# Patient Record
Sex: FEMALE | Race: BLACK OR AFRICAN AMERICAN | Employment: FULL TIME | URBAN - METROPOLITAN AREA
[De-identification: names, ages, dates, MRNs, and addresses within clinical notes are randomized per-mention and may not be internally consistent; named-entity substitution may affect disease eponyms.]

---

## 2017-04-14 DIAGNOSIS — E03.9 ACQUIRED HYPOTHYROIDISM: ICD-10-CM

## 2017-04-14 DIAGNOSIS — I10 ESSENTIAL HYPERTENSION: ICD-10-CM

## 2017-04-14 NOTE — TELEPHONE ENCOUNTER
Pt needs Synthroid and her fluid pill sent to [facility] on file. Pt has not been seen since August 2014.

## 2017-04-17 RX ORDER — HYDROCHLOROTHIAZIDE 25 MG/1
25 TABLET ORAL DAILY
Qty: 90 TAB | Refills: 0 | Status: SHIPPED | OUTPATIENT
Start: 2017-04-17 | End: 2017-07-07 | Stop reason: SDUPTHER

## 2017-04-17 RX ORDER — LEVOTHYROXINE SODIUM 175 UG/1
175 TABLET ORAL
Qty: 90 TAB | Refills: 0 | Status: SHIPPED | OUTPATIENT
Start: 2017-04-17 | End: 2017-07-07 | Stop reason: SDUPTHER

## 2017-04-17 NOTE — TELEPHONE ENCOUNTER
Called pt and informed her that she has not been seen since August 2014 and would have to be seen for further refills. Pt stated \"that's a lie\" and that Dr. Abelina Simmonds told her she could just call when she needed refills and that she had 3 brain sugeries last year and can't go without med.

## 2017-04-17 NOTE — TELEPHONE ENCOUNTER
Attempted to call. Unsuccessful. Left msg for Roberto Daily to give us a call back at the office. A callback number was left.

## 2017-04-17 NOTE — TELEPHONE ENCOUNTER
We will send 2 months supply of thyroid medication and with out seeing and checking labs I can not  send refills , as they are medication interactions and adverse reactions which we have watch for     If she was seen in 2014 - more than 2 years ago     She can get it from PCP or has to be seen for future refills

## 2017-04-17 NOTE — TELEPHONE ENCOUNTER
PSR Mor informed pt of Dr. Andres Monique note. Next available appt not unitl July. Pt scheduled for 7/6/17.

## 2017-07-07 ENCOUNTER — OFFICE VISIT (OUTPATIENT)
Dept: ENDOCRINOLOGY | Age: 52
End: 2017-07-07

## 2017-07-07 VITALS
HEART RATE: 82 BPM | OXYGEN SATURATION: 100 % | BODY MASS INDEX: 45.18 KG/M2 | TEMPERATURE: 98.8 F | DIASTOLIC BLOOD PRESSURE: 75 MMHG | HEIGHT: 65 IN | WEIGHT: 271.2 LBS | RESPIRATION RATE: 18 BRPM | SYSTOLIC BLOOD PRESSURE: 126 MMHG

## 2017-07-07 DIAGNOSIS — E03.9 ACQUIRED HYPOTHYROIDISM: ICD-10-CM

## 2017-07-07 DIAGNOSIS — E03.9 ACQUIRED HYPOTHYROIDISM: Primary | ICD-10-CM

## 2017-07-07 DIAGNOSIS — I10 ESSENTIAL HYPERTENSION: ICD-10-CM

## 2017-07-07 DIAGNOSIS — E23.6 EMPTY SELLA (HCC): ICD-10-CM

## 2017-07-07 RX ORDER — HYDROCHLOROTHIAZIDE 25 MG/1
25 TABLET ORAL DAILY
Qty: 90 TAB | Refills: 3 | Status: SHIPPED | OUTPATIENT
Start: 2017-07-07 | End: 2018-12-07 | Stop reason: SDUPTHER

## 2017-07-07 RX ORDER — LEVOTHYROXINE SODIUM 175 UG/1
175 TABLET ORAL
Qty: 90 TAB | Refills: 3 | Status: SHIPPED | OUTPATIENT
Start: 2017-07-07 | End: 2017-07-18 | Stop reason: SDUPTHER

## 2017-07-07 RX ORDER — ASPIRIN 81 MG/1
TABLET ORAL DAILY
COMMUNITY

## 2017-07-07 RX ORDER — AMOXICILLIN 500 MG/1
500 TABLET, FILM COATED ORAL 3 TIMES DAILY
Qty: 15 TAB | Refills: 0 | Status: SHIPPED | OUTPATIENT
Start: 2017-07-07 | End: 2018-12-07 | Stop reason: ALTCHOICE

## 2017-07-07 NOTE — MR AVS SNAPSHOT
Visit Information Date & Time Provider Department Dept. Phone Encounter #  
 7/7/2017  9:30 AM Kiera Herrera MD [facility] [phone] [id number] Follow-up Instructions Return in about 1 year (around 7/7/2018). Upcoming Health Maintenance Date Due DTaP/Tdap/Td series (1 - Tdap) 10/31/1988 PAP AKA CERVICAL CYTOLOGY 10/31/1988 Pneumococcal 19-64 High Risk (2 of 2 - PCV13) 2/19/2017 INFLUENZA AGE 9 TO ADULT 8/1/2017 Allergies as of 7/7/2017  Review Complete On: 7/7/2017 By: José Miguel Tse RN Severity Noted Reaction Type Reactions Latex  07/07/2017    Itching [place] High 02/15/2016   Systemic Hives Other Medication  07/07/2017    Rash Tape adhesive Current Immunizations  Never Reviewed Name Date Pneumococcal Polysaccharide (PPSV-23) 2/19/2016  1:06 PM  
  
 Not reviewed this visit You Were Diagnosed With   
  
 Codes Comments Acquired hypothyroidism    -  Primary ICD-10-CM: E03.9 ICD-9-CM: 244.9 Empty sella ([address]     ICD-10-CM: E23.6 ICD-9-CM: 253.8 Essential hypertension     ICD-10-CM: I10 
ICD-9-CM: 401.9 Vitals BP Pulse Temp Resp Height(growth percentile) Weight(growth percentile) 126/75 (BP 1 Location: Right arm, BP Patient Position: Sitting) 82 98.8 °F (37.1 °C) (Oral) 18 5' 5\" (1.651 m) 271 lb 3.2 oz (123 kg) SpO2 BMI OB Status Smoking Status 100% 45.13 kg/m2 Hysterectomy Current Every Day Smoker BMI and BSA Data Body Mass Index Body Surface Area  
 45.13 kg/m 2 2.38 m 2 Preferred Pharmacy Pharmacy Name Phone [facility] [address] Your Updated Medication List  
  
   
This list is accurate as of: 7/7/17 10:32 AM.  Always use your most recent med list.  
  
  
  
  
 acetaZOLAMIDE  mg capsule Commonly known as:  DIAMOX Take 1 Cap by mouth every twelve (12) hours. aspirin delayed-release 81 mg tablet Take  by mouth daily. hydroCHLOROthiazide 25 mg tablet Commonly known as:  HYDRODIURIL Take 1 Tab by mouth daily. HYDROCODONE BITARTRATE PO Take  by mouth.  
  
 levothyroxine 175 mcg tablet Commonly known as:  SYNTHROID Take 1 Tab by mouth Daily (before breakfast). Follow-up Instructions Return in about 1 year (around 7/7/2018). Introducing [facility]! Dear Stevo Fuller: Thank you for requesting a [employer] account. Our records indicate that you already have an active [employer] account. You can access your account anytime at [email or web address] Did you know that you can access your hospital and ER discharge instructions at any time in [employer]? You can also review all of your test results from your hospital stay or ER visit. Additional Information If you have questions, please visit the Frequently Asked Questions section of the [employer] website at [email or web address] Remember, [employer] is NOT to be used for urgent needs. For medical emergencies, dial 911. Now available from your iPhone and Android! Please provide this summary of care documentation to your next provider. Your primary care clinician is listed as NONE. If you have any questions after today's visit, please call [phone].

## 2017-07-07 NOTE — PROGRESS NOTES
Davina Dunn MD              Patient Information  Date:7/7/2017  Name : Chet Juárez 54 y.o.     YOB: 1965         Referred by: Dr Helga Colin      Chief Complaint   Patient presents with    Thyroid Problem       History of Present Illness: Chet Juárez is a 54 y.o. female here follow-up    History of primary hypothyroidism on replacement levothyroxine. Reports compliance   Cough with yellowish sputum , 3 weeks ,feels warm  Seen in 2014 and came of med refills  Has moved to [facility] and looking for PCP now   No change in the size of the neck. Found to have partial empty sella on MRI,pituitary hormonal screen normal    12/13  MRI   The enlarged sella turcica is unchanged and predominantly filled with CSF. The pituitary gland is located in the inferior periphery of the sella. No abnormal enhancement. The infundibulum is midline and demonstrates normal signal and caliber. No mass effect on the optic chiasm. The cavernous   sinuses are within normal limits. The cavernous carotid flow-voids are   patent. Optic nerves may be slightly thickened. Subtle flattening of the optic nerve   insertions on the globe. History of primary hypothyroidism on replacement levothyroxine.     Family hx thyroid cancer    Wt Readings from Last 3 Encounters:   07/07/17 271 lb 3.2 oz (123 kg)   04/11/16 277 lb 12.8 oz (126 kg)   02/19/16 282 lb 3 oz (128 kg)     Past Medical History:   Diagnosis Date    Arthritis     Coagulation disorder ([address]     anemia    Endocrine disease     Fluid retention     Headache     Hypertension     Hypothyroid     Neurological disorder     migraine    Sleep apnea     STATES TESTED, NO MACHINE RECOMMENDED     Past Surgical History:   Procedure Laterality Date    HX OTHER SURGICAL      brain surgery cerberi pseudotumor    HX PARTIAL HYSTERECTOMY      HX TONSILLECTOMY       Current Outpatient Prescriptions   Medication Sig    aspirin delayed-release 81 mg tablet Take  by mouth daily.  hydroCHLOROthiazide (HYDRODIURIL) 25 mg tablet Take 1 Tab by mouth daily.  levothyroxine (SYNTHROID) 175 mcg tablet Take 1 Tab by mouth Daily (before breakfast).  HYDROCODONE BITARTRATE PO Take  by mouth.  acetaZOLAMIDE SR (DIAMOX) 500 mg capsule Take 1 Cap by mouth every twelve (12) hours. No current facility-administered medications for this visit. Allergies   Allergen Reactions    Latex Itching    [place] Hives    Other Medication Rash     Tape adhesive         Review of Systems:  - Constitutional Symptoms: no fevers, chills,   - Eyes: no blurry vision no double vision  - Cardiovascular: no chest pain no palpitations  - Respiratory: +cough no shortness of breath  - Gastrointestinal: no dysphagia no abdominal pain  - Musculoskeletal: no joint pains no weakness  - Integumentary: no rashes  - Neurological: no numbness, tingling + headaches  -     Physical Examination:  - Blood pressure 126/75, pulse 82, temperature 98.8 °F (37.1 °C), temperature source Oral, resp. rate 18, height 5' 5\" (1.651 m), weight 271 lb 3.2 oz (123 kg), SpO2 100 %. Body mass index is 45.13 kg/(m^2).   - General: pleasant, no distress, good eye contact  - HEENT: no exopthalmos, no periorbital edema, no scleral/conjunctival injection, EOMI, no lid lag or stare,no sinus tenderness  - Neck: supple, no thyromegaly  - Cardiovascular: regular, normal rate, normal S1 and S2  - Respiratory: clear to auscultation bilaterally  - Gastrointestinal: soft, nontender, nondistended  - Musculoskeletal:   no rashes, no edema, no foot ulcers  - Neurological: reflexes 2+ at biceps, no tremor  - Psychiatric: normal mood and affect    Data Reviewed:     Lab Results   Component Value Date/Time    WBC 12.7 02/17/2016 05:12 AM    Hemoglobin (POC) 13.3 02/16/2016 06:40 AM    HGB 12.3 02/17/2016 05:12 AM    Hematocrit (POC) 39 02/16/2016 06:40 AM    HCT 37.4 02/17/2016 05:12 AM    PLATELET 180 02/17/2016 05:12 AM    MCV 91.0 02/17/2016 05:12 AM     Lab Results   Component Value Date/Time    ALT (SGPT) 21 12/31/2013 05:24 PM    AST (SGOT) 10 12/31/2013 05:24 PM    Alk. phosphatase 88 12/31/2013 05:24 PM    Bilirubin, total 0.4 12/31/2013 05:24 PM     Lab Results   Component Value Date/Time    GFR est AA >60 02/18/2016 03:02 AM    GFR est non-AA >60 02/18/2016 03:02 AM    Creatinine (POC) 0.9 02/16/2016 06:40 AM    Creatinine 0.98 02/18/2016 03:02 AM    BUN 12 02/18/2016 03:02 AM    BUN (POC) 17 02/16/2016 06:40 AM    Sodium (POC) 141 02/16/2016 06:40 AM    Sodium 140 02/18/2016 03:02 AM    Potassium 3.9 02/18/2016 03:02 AM    Potassium (POC) 3.4 02/16/2016 06:40 AM    Chloride (POC) 103 02/16/2016 06:40 AM    Chloride 109 02/18/2016 03:02 AM    CO2 21 02/18/2016 03:02 AM      Lab Results   Component Value Date/Time    TSH 1.920 08/22/2014 10:31 AM    T4, Free 1.48 02/04/2014 12:00 AM      Lab Results   Component Value Date/Time    Glucose 124 02/18/2016 03:02 AM    Glucose (POC) 113 02/19/2016 11:46 AM        Assessment/Plan:      Partial empty sella   Pituitary hormonal screen normal 2/2014      Primary hypothyroidism-  Levothyroxine   Recheck TSH    HTN -     History of pseudo-tumor cerebral -FU with neurology      Bronchitis          Thank you for allowing me to participate in the care of this patient.     Shraddha Yee MD

## 2017-07-07 NOTE — LETTER
NOTIFICATION RETURN TO WORK / SCHOOL 
 
7/7/2017 10:39 AM 
 
Ms. Benson Mcgovern [facility] [address] To Whom It May Concern: 
 
Benson Mcgovern is currently under the care of [address] [place] [address]. She was seen on Friday, July 7, 2017 and will return on Monday, July 10, 2017. If there are questions or concerns please have the patient contact our office. Sincerely, Consuelo Barber MD

## 2017-07-07 NOTE — PROGRESS NOTES
Wt Readings from Last 3 Encounters:   07/07/17 271 lb 3.2 oz (123 kg)   04/11/16 277 lb 12.8 oz (126 kg)   02/19/16 282 lb 3 oz (128 kg)     Temp Readings from Last 3 Encounters:   07/07/17 98.8 °F (37.1 °C) (Oral)   04/21/16 98.3 °F (36.8 °C) (Oral)   03/31/16 98.1 °F (36.7 °C) (Oral)     BP Readings from Last 3 Encounters:   07/07/17 126/75   04/27/16 115/74   04/21/16 (!) 155/104     Pulse Readings from Last 3 Encounters:   07/07/17 82   04/27/16 82   04/21/16 (!) 102

## 2017-07-12 LAB
ACTH PLAS-MCNC: 10 PG/ML (ref 7.2–63.3)
ALBUMIN SERPL-MCNC: 3.9 G/DL (ref 3.5–5.5)
ALBUMIN/GLOB SERPL: 1.3 {RATIO} (ref 1.2–2.2)
ALP SERPL-CCNC: 76 IU/L (ref 39–117)
ALT SERPL-CCNC: 18 IU/L (ref 0–32)
AST SERPL-CCNC: 16 IU/L (ref 0–40)
BILIRUB SERPL-MCNC: 0.3 MG/DL (ref 0–1.2)
BUN SERPL-MCNC: 18 MG/DL (ref 6–24)
BUN/CREAT SERPL: 18 (ref 9–23)
CALCIUM SERPL-MCNC: 9.1 MG/DL (ref 8.7–10.2)
CHLORIDE SERPL-SCNC: 105 MMOL/L (ref 96–106)
CO2 SERPL-SCNC: 24 MMOL/L (ref 18–29)
CORTIS SERPL-MCNC: 10.8 UG/DL
CREAT SERPL-MCNC: 1 MG/DL (ref 0.57–1)
GLOBULIN SER CALC-MCNC: 3.1 G/DL (ref 1.5–4.5)
GLUCOSE SERPL-MCNC: 90 MG/DL (ref 65–99)
IGF-I SERPL-MCNC: 140 NG/ML
POTASSIUM SERPL-SCNC: 4.5 MMOL/L (ref 3.5–5.2)
PROLACTIN SERPL-MCNC: 8.4 NG/ML (ref 4.8–23.3)
PROT SERPL-MCNC: 7 G/DL (ref 6–8.5)
SODIUM SERPL-SCNC: 141 MMOL/L (ref 134–144)
TSH SERPL DL<=0.005 MIU/L-ACNC: 0.34 UIU/ML (ref 0.45–4.5)

## 2017-07-18 DIAGNOSIS — E03.9 ACQUIRED HYPOTHYROIDISM: Primary | ICD-10-CM

## 2017-07-18 RX ORDER — LEVOTHYROXINE SODIUM 150 UG/1
150 TABLET ORAL
Qty: 30 TAB | Refills: 5 | Status: SHIPPED | OUTPATIENT
Start: 2017-07-18 | End: 2018-03-05 | Stop reason: SDUPTHER

## 2017-07-18 NOTE — TELEPHONE ENCOUNTER
Informed pt's mother per pt that thyroid medication is being decreased to 150 mcg and it will be sent local pharmacy today, she can check after 5PM for .  Pt's mother verbalized understanding.     ----- Message from Len Ross MD sent at 7/16/2017 12:33 PM EDT -----  Decrease levothyroxine to 150 mcg

## 2018-03-05 DIAGNOSIS — E03.9 ACQUIRED HYPOTHYROIDISM: ICD-10-CM

## 2018-03-06 RX ORDER — LEVOTHYROXINE SODIUM 150 UG/1
150 TABLET ORAL
Qty: 90 TAB | Refills: 3 | Status: SHIPPED | OUTPATIENT
Start: 2018-03-06 | End: 2018-12-10 | Stop reason: SDUPTHER

## 2018-12-07 ENCOUNTER — OFFICE VISIT (OUTPATIENT)
Dept: ENDOCRINOLOGY | Age: 53
End: 2018-12-07

## 2018-12-07 VITALS
TEMPERATURE: 97.6 F | HEART RATE: 101 BPM | DIASTOLIC BLOOD PRESSURE: 81 MMHG | BODY MASS INDEX: 46.55 KG/M2 | OXYGEN SATURATION: 98 % | SYSTOLIC BLOOD PRESSURE: 132 MMHG | HEIGHT: 65 IN | WEIGHT: 279.4 LBS | RESPIRATION RATE: 16 BRPM

## 2018-12-07 DIAGNOSIS — E23.6 EMPTY SELLA (HCC): Primary | ICD-10-CM

## 2018-12-07 DIAGNOSIS — E03.9 ACQUIRED HYPOTHYROIDISM: ICD-10-CM

## 2018-12-07 NOTE — PROGRESS NOTES
Tanisha Wolff is a 48 y.o. female here for   Chief Complaint   Patient presents with    Thyroid Problem       1. Have you been to the ER, urgent care clinic since your last visit? Hospitalized since your last visit? -no    2. Have you seen or consulted any other health care providers outside of the [address] [place] [address] since your last visit?   Include any pap smears or colon screening.-Dr. Mercedes Black

## 2018-12-07 NOTE — PROGRESS NOTES
Thea Collado MD              Patient Information  Date:12/7/2018  Name : Justin Sainz 48 y.o.     YOB: 1965         Referred by: Dr Evie Pena      Chief Complaint   Patient presents with    Thyroid Problem       History of Present Illness: Justin Sainz is a 48 y.o. female here follow-up    History of primary hypothyroidism on replacement levothyroxine. Since last 1 year noticed excessive sweating at night, history of hysterectomy so has not been having cycles for a long time  Weight has been fluctuating  No change in the size of the neck. Found to have partial empty sella on MRI,pituitary hormonal screen normal    12/13  MRI   The enlarged sella turcica is unchanged and predominantly filled with CSF. The pituitary gland is located in the inferior periphery of the sella. No abnormal enhancement. The infundibulum is midline and demonstrates normal signal and caliber. No mass effect on the optic chiasm. The cavernous   sinuses are within normal limits. The cavernous carotid flow-voids are   patent. Optic nerves may be slightly thickened. Subtle flattening of the optic nerve   insertions on the globe. History of primary hypothyroidism on replacement levothyroxine.     Family hx thyroid cancer    Wt Readings from Last 3 Encounters:   12/07/18 279 lb 6.4 oz (126.7 kg)   07/07/17 271 lb 3.2 oz (123 kg)   04/11/16 277 lb 12.8 oz (126 kg)     Past Medical History:   Diagnosis Date    Arthritis     Coagulation disorder ([address]     anemia    Endocrine disease     Fluid retention     Headache(784.0)     Hypertension     Hypothyroid     Neurological disorder     migraine    Sleep apnea     STATES TESTED, NO MACHINE RECOMMENDED     Past Surgical History:   Procedure Laterality Date    HX OTHER SURGICAL      brain surgery cerberi pseudotumor    HX PARTIAL HYSTERECTOMY      HX TONSILLECTOMY       Current Outpatient Medications   Medication Sig    hydroCHLOROthiazide (HYDRODIURIL) 25 mg tablet TAKE ONE TABLET BY MOUTH ONCE DAILY    levothyroxine (SYNTHROID) 150 mcg tablet Take 1 Tab by mouth Daily (before breakfast).  aspirin delayed-release 81 mg tablet Take  by mouth daily.  HYDROCODONE BITARTRATE PO Take  by mouth.  acetaZOLAMIDE SR (DIAMOX) 500 mg capsule Take 1 Cap by mouth every twelve (12) hours. (Patient taking differently: Take 500 mg by mouth as needed.)     No current facility-administered medications for this visit. Allergies   Allergen Reactions    Latex Itching    [place] Hives    Other Medication Rash     Tape adhesive         Review of Systems:  - Constitutional Symptoms: no fevers, chills,   - Eyes: no blurry vision no double vision  - Cardiovascular: no chest pain no palpitations  - Respiratory: +cough no shortness of breath  - Gastrointestinal: no dysphagia no abdominal pain  - Musculoskeletal: no joint pains no weakness  - Integumentary: no rashes  - Neurological: no numbness, tingling + headaches  -     Physical Examination:  - Blood pressure 132/81, pulse (!) 101, temperature 97.6 °F (36.4 °C), temperature source Oral, resp. rate 16, height 5' 5\" (1.651 m), weight 279 lb 6.4 oz (126.7 kg), SpO2 98 %. Body mass index is 46.49 kg/m².   - General: pleasant, no distress, good eye contact  - HEENT: no exopthalmos, no periorbital edema, no scleral/conjunctival injection, EOMI, no lid lag or stare,no sinus tenderness  - Neck: supple, no thyromegaly  - Cardiovascular: regular, normal rate, normal S1 and S2  - Respiratory: clear to auscultation bilaterally  - Gastrointestinal: soft, nontender, nondistended  - Musculoskeletal:   no rashes, no edema, no foot ulcers  - Neurological: reflexes 2+ at biceps, no tremor  - Psychiatric: normal mood and affect    Data Reviewed:     Lab Results   Component Value Date/Time    WBC 12.7 (H) 02/17/2016 05:12 AM    Hemoglobin (POC) 13.3 02/16/2016 06:40 AM    HGB 12.3 02/17/2016 05:12 AM Hematocrit (POC) 39 02/16/2016 06:40 AM    HCT 37.4 02/17/2016 05:12 AM    PLATELET 124 57/05/5394 05:12 AM    MCV 91.0 02/17/2016 05:12 AM     Lab Results   Component Value Date/Time    ALT (SGPT) 18 07/07/2017 10:55 AM    AST (SGOT) 16 07/07/2017 10:55 AM    Alk. phosphatase 76 07/07/2017 10:55 AM    Bilirubin, total 0.3 07/07/2017 10:55 AM     Lab Results   Component Value Date/Time    GFR est AA 76 07/07/2017 10:55 AM    GFR est non-AA 66 07/07/2017 10:55 AM    Creatinine (POC) 0.9 02/16/2016 06:40 AM    Creatinine 1.00 07/07/2017 10:55 AM    BUN 18 07/07/2017 10:55 AM    BUN (POC) 17 02/16/2016 06:40 AM    Sodium (POC) 141 02/16/2016 06:40 AM    Sodium 141 07/07/2017 10:55 AM    Potassium 4.5 07/07/2017 10:55 AM    Potassium (POC) 3.4 (L) 02/16/2016 06:40 AM    Chloride (POC) 103 02/16/2016 06:40 AM    Chloride 105 07/07/2017 10:55 AM    CO2 24 07/07/2017 10:55 AM      Lab Results   Component Value Date/Time    TSH 0.336 (L) 07/07/2017 10:55 AM    T4, Free 1.48 02/04/2014 12:00 AM      Lab Results   Component Value Date/Time    Glucose 90 07/07/2017 10:55 AM    Glucose (POC) 113 (H) 02/19/2016 11:46 AM        Assessment/Plan:      Partial empty sella   Pituitary hormonal screen normal 2/2014  Screen antigen pituitary hormones    Primary hypothyroidism-  Levothyroxine   Recheck TSH    Vasomotor symptoms of menopause      History of pseudo-tumor cerebral -FU with neurology/ENT       Bronchitis          Thank you for allowing me to participate in the care of this patient.     Brad Goins MD    Patient verbalized understanding

## 2018-12-10 ENCOUNTER — TELEPHONE (OUTPATIENT)
Dept: ENDOCRINOLOGY | Age: 53
End: 2018-12-10

## 2018-12-10 DIAGNOSIS — E03.9 ACQUIRED HYPOTHYROIDISM: ICD-10-CM

## 2018-12-10 LAB
ACTH PLAS-MCNC: 9.9 PG/ML (ref 7.2–63.3)
BUN SERPL-MCNC: 18 MG/DL (ref 6–24)
BUN/CREAT SERPL: 16 (ref 9–23)
CALCIUM SERPL-MCNC: 9.7 MG/DL (ref 8.7–10.2)
CHLORIDE SERPL-SCNC: 103 MMOL/L (ref 96–106)
CO2 SERPL-SCNC: 26 MMOL/L (ref 20–29)
CORTIS AM PEAK SERPL-MCNC: 18.2 UG/DL (ref 6.2–19.4)
CREAT SERPL-MCNC: 1.1 MG/DL (ref 0.57–1)
GLUCOSE SERPL-MCNC: 83 MG/DL (ref 65–99)
INTERPRETATION: NORMAL
POTASSIUM SERPL-SCNC: 4.2 MMOL/L (ref 3.5–5.2)
PROLACTIN SERPL-MCNC: 10.9 NG/ML (ref 4.8–23.3)
SODIUM SERPL-SCNC: 143 MMOL/L (ref 134–144)
T4 FREE SERPL-MCNC: 1.6 NG/DL (ref 0.82–1.77)
TSH SERPL DL<=0.005 MIU/L-ACNC: 3.69 UIU/ML (ref 0.45–4.5)

## 2018-12-10 RX ORDER — LEVOTHYROXINE SODIUM 150 UG/1
150 TABLET ORAL
Qty: 90 TAB | Refills: 3 | Status: SHIPPED | OUTPATIENT
Start: 2018-12-10 | End: 2020-04-10

## 2018-12-10 NOTE — TELEPHONE ENCOUNTER
Per Dr. Adelaide Al, informed pt of result note, as noted above. Pt verbalized understanding and asked for rx to be sent in as she left meds in [place]. No further questions or concerns at this time.

## 2019-12-08 DIAGNOSIS — I10 ESSENTIAL HYPERTENSION: ICD-10-CM

## 2019-12-08 RX ORDER — HYDROCHLOROTHIAZIDE 25 MG/1
TABLET ORAL
Qty: 90 TAB | Refills: 8 | Status: SHIPPED | OUTPATIENT
Start: 2019-12-08 | End: 2020-12-14

## 2020-04-10 DIAGNOSIS — E03.9 ACQUIRED HYPOTHYROIDISM: ICD-10-CM

## 2020-04-10 RX ORDER — LEVOTHYROXINE SODIUM 150 UG/1
TABLET ORAL
Qty: 90 TAB | Refills: 0 | Status: SHIPPED | OUTPATIENT
Start: 2020-04-10 | End: 2020-07-07

## 2020-07-07 DIAGNOSIS — E03.9 ACQUIRED HYPOTHYROIDISM: ICD-10-CM

## 2020-07-07 RX ORDER — LEVOTHYROXINE SODIUM 150 UG/1
TABLET ORAL
Qty: 90 TAB | Refills: 0 | Status: SHIPPED | OUTPATIENT
Start: 2020-07-07 | End: 2020-12-14

## 2020-08-28 ENCOUNTER — OFFICE VISIT (OUTPATIENT)
Dept: ENDOCRINOLOGY | Age: 55
End: 2020-08-28
Payer: COMMERCIAL

## 2020-08-28 VITALS
WEIGHT: 286 LBS | RESPIRATION RATE: 16 BRPM | TEMPERATURE: 96.9 F | OXYGEN SATURATION: 99 % | BODY MASS INDEX: 47.65 KG/M2 | HEART RATE: 85 BPM | SYSTOLIC BLOOD PRESSURE: 106 MMHG | DIASTOLIC BLOOD PRESSURE: 68 MMHG | HEIGHT: 65 IN

## 2020-08-28 DIAGNOSIS — E66.01 MORBID OBESITY (HCC): ICD-10-CM

## 2020-08-28 DIAGNOSIS — E23.6 EMPTY SELLA (HCC): ICD-10-CM

## 2020-08-28 DIAGNOSIS — E03.9 ACQUIRED HYPOTHYROIDISM: Primary | ICD-10-CM

## 2020-08-28 LAB
ANION GAP SERPL CALC-SCNC: 5 MMOL/L (ref 5–15)
BUN SERPL-MCNC: 19 MG/DL (ref 6–20)
BUN/CREAT SERPL: 19 (ref 12–20)
CALCIUM SERPL-MCNC: 9.7 MG/DL (ref 8.5–10.1)
CHLORIDE SERPL-SCNC: 107 MMOL/L (ref 97–108)
CO2 SERPL-SCNC: 28 MMOL/L (ref 21–32)
CORTIS SERPL-MCNC: 6.6 UG/DL
CREAT SERPL-MCNC: 0.99 MG/DL (ref 0.55–1.02)
GLUCOSE SERPL-MCNC: 108 MG/DL (ref 65–100)
POTASSIUM SERPL-SCNC: 3.8 MMOL/L (ref 3.5–5.1)
PROLACTIN SERPL-MCNC: 5.3 NG/ML
SODIUM SERPL-SCNC: 140 MMOL/L (ref 136–145)
T4 FREE SERPL-MCNC: 1.5 NG/DL (ref 0.8–1.5)
TSH SERPL DL<=0.05 MIU/L-ACNC: 0.24 UIU/ML (ref 0.36–3.74)

## 2020-08-28 PROCEDURE — 99214 OFFICE O/P EST MOD 30 MIN: CPT | Performed by: INTERNAL MEDICINE

## 2020-08-28 RX ORDER — BISMUTH SUBSALICYLATE 262 MG
1 TABLET,CHEWABLE ORAL DAILY
COMMUNITY

## 2020-08-28 NOTE — PROGRESS NOTES
Radha Husbands is a 48 y.o. female here for   Chief Complaint   Patient presents with    Thyroid Problem       1. Have you been to the ER, urgent care clinic since your last visit? Hospitalized since your last visit? -no    2. Have you seen or consulted any other health care providers outside of the [address] [place] [address] since your last visit?   Include any pap smears or colon screening.-no

## 2020-08-28 NOTE — PROGRESS NOTES
Charlene Quan MD              Patient Information  Date:8/29/2020  Name : Radha Husbands 48 y.o.     YOB: 1965         Referred by: Dr Faiza Cook      Chief Complaint   Patient presents with    Thyroid Problem       History of Present Illness: Radha Husbands is a 48 y.o. female here follow-up    History of primary hypothyroidism on replacement levothyroxine. Since last 1 year noticed excessive sweating at night, history of hysterectomy so has not been having cycles for a long time  Weight has been fluctuating  No change in the size of the neck. She has not had labs, last seen in 2018      Found to have partial empty sella on MRI,pituitary hormonal screen normal    12/13  MRI   The enlarged sella turcica is unchanged and predominantly filled with CSF. The pituitary gland is located in the inferior periphery of the sella. No abnormal enhancement. The infundibulum is midline and demonstrates normal signal and caliber. No mass effect on the optic chiasm. The cavernous   sinuses are within normal limits. The cavernous carotid flow-voids are   patent. Optic nerves may be slightly thickened. Subtle flattening of the optic nerve   insertions on the globe. History of primary hypothyroidism on replacement levothyroxine.     Family hx thyroid cancer    Wt Readings from Last 3 Encounters:   08/28/20 286 lb (129.7 kg)   12/07/18 279 lb 6.4 oz (126.7 kg)   07/07/17 271 lb 3.2 oz (123 kg)     Past Medical History:   Diagnosis Date    Arthritis     Coagulation disorder ([address]     anemia    Endocrine disease     Fluid retention     Headache(784.0)     Hypertension     Hypothyroid     Neurological disorder     migraine    Sleep apnea     STATES TESTED, NO MACHINE RECOMMENDED     Past Surgical History:   Procedure Laterality Date    HX OTHER SURGICAL      brain surgery cerberi pseudotumor    HX PARTIAL HYSTERECTOMY      HX TONSILLECTOMY       Current Outpatient Medications   Medication Sig    multivitamin (ONE A DAY) tablet Take 1 Tab by mouth daily. Turbo power - fortified aloe vera, kym, maqui berry and acai, 47 vitamins enzmes herbs and super nutrients 17 antioxidenta    OTHER Joint - clucosamine, chondroitin, msm, vit c hyaluronis acid    Euthyrox 150 mcg tablet TAKE 1 TABLET BY MOUTH ONCE DAILY BEFORE BREAKFAST    hydroCHLOROthiazide (HYDRODIURIL) 25 mg tablet TAKE 1 TABLET BY MOUTH ONCE DAILY    aspirin delayed-release 81 mg tablet Take  by mouth daily.  acetaZOLAMIDE SR (DIAMOX) 500 mg capsule Take 1 Cap by mouth every twelve (12) hours.  HYDROCODONE BITARTRATE PO Take  by mouth. No current facility-administered medications for this visit. Allergies   Allergen Reactions    Latex Itching    [place] [facility] Hives    Other Medication Rash     Tape adhesive         Review of Systems:  - Constitutional Symptoms: no fevers, chills,   - Eyes: no blurry vision no double vision  - Cardiovascular: no chest pain no palpitations  - Respiratory: No cough no shortness of breath  - Gastrointestinal: no dysphagia no abdominal pain  - Musculoskeletal: no joint pains no weakness  - Integumentary: no rashes  - Neurological: no numbness, tingling + headaches  -     Physical Examination:  - Blood pressure 106/68, pulse 85, temperature 96.9 °F (36.1 °C), temperature source Oral, resp. rate 16, height 5' 5\" (1.651 m), weight 286 lb (129.7 kg), SpO2 99 %. Body mass index is 47.59 kg/m².   - General: pleasant, no distress, good eye contact  - HEENT: no exopthalmos, no periorbital edema, no scleral/conjunctival injection, EOMI, no lid lag or stare,no sinus tenderness  - Neck: supple, no thyromegaly  - Cardiovascular: regular, normal rate, normal S1 and S2  - Respiratory: clear to auscultation bilaterally  - Gastrointestinal: soft, nontender, nondistended  - Musculoskeletal:   no rashes, no edema,  - Neurological: Alert and oriented x3, no focal deficits  - Psychiatric: normal mood and affect    Data Reviewed:     Lab Results   Component Value Date/Time    WBC 12.7 (H) 02/17/2016 05:12 AM    Hemoglobin (POC) 13.3 02/16/2016 06:40 AM    HGB 12.3 02/17/2016 05:12 AM    Hematocrit (POC) 39 02/16/2016 06:40 AM    HCT 37.4 02/17/2016 05:12 AM    PLATELET 840 48/40/1207 05:12 AM    MCV 91.0 02/17/2016 05:12 AM     Lab Results   Component Value Date/Time    ALT (SGPT) 18 07/07/2017 10:55 AM    Alk. phosphatase 76 07/07/2017 10:55 AM    Bilirubin, total 0.3 07/07/2017 10:55 AM     Lab Results   Component Value Date/Time    GFR est AA >60 08/28/2020 12:59 PM    GFR est non-AA 59 (L) 08/28/2020 12:59 PM    Creatinine (POC) 0.9 02/16/2016 06:40 AM    Creatinine 0.99 08/28/2020 12:59 PM    BUN 19 08/28/2020 12:59 PM    BUN (POC) 17 02/16/2016 06:40 AM    Sodium (POC) 141 02/16/2016 06:40 AM    Sodium 140 08/28/2020 12:59 PM    Potassium 3.8 08/28/2020 12:59 PM    Potassium (POC) 3.4 (L) 02/16/2016 06:40 AM    Chloride (POC) 103 02/16/2016 06:40 AM    Chloride 107 08/28/2020 12:59 PM    CO2 28 08/28/2020 12:59 PM      Lab Results   Component Value Date/Time    TSH 0.24 (L) 08/28/2020 12:59 PM    T4, Free 1.5 08/28/2020 12:59 PM      Lab Results   Component Value Date/Time    Glucose 108 (H) 08/28/2020 12:59 PM    Glucose (POC) 113 (H) 02/19/2016 11:46 AM        Assessment/Plan:      Partial empty sella   Pituitary hormonal screen normal 2/2014  Screen antigen pituitary hormones    Primary hypothyroidism-  Levothyroxine   Recheck TSH    Vasomotor symptoms of menopause: Not bad      History of pseudo-tumor cerebral -FU with neurology/ENT               Thank you for allowing me to participate in the care of this patient.     Zion Moody MD    Patient verbalized understanding

## 2020-08-29 LAB
ACTH PLAS-MCNC: 5.2 PG/ML (ref 7.2–63.3)
ESTRADIOL SERPL-MCNC: 21.4 PG/ML
FSH SERPL-ACNC: 65.4 MIU/ML

## 2020-12-14 DIAGNOSIS — I10 ESSENTIAL HYPERTENSION: ICD-10-CM

## 2020-12-14 DIAGNOSIS — E03.9 ACQUIRED HYPOTHYROIDISM: ICD-10-CM

## 2020-12-14 RX ORDER — HYDROCHLOROTHIAZIDE 25 MG/1
TABLET ORAL
Qty: 90 TAB | Refills: 0 | Status: SHIPPED | OUTPATIENT
Start: 2020-12-14 | End: 2021-03-23

## 2020-12-14 RX ORDER — LEVOTHYROXINE SODIUM 150 UG/1
TABLET ORAL
Qty: 90 TAB | Refills: 0 | Status: SHIPPED | OUTPATIENT
Start: 2020-12-14 | End: 2021-03-23

## 2021-06-01 ENCOUNTER — OFFICE VISIT (OUTPATIENT)
Dept: OBGYN CLINIC | Age: 56
End: 2021-06-01
Payer: COMMERCIAL

## 2021-06-01 VITALS
DIASTOLIC BLOOD PRESSURE: 74 MMHG | SYSTOLIC BLOOD PRESSURE: 122 MMHG | BODY MASS INDEX: 47.32 KG/M2 | WEIGHT: 284 LBS | HEIGHT: 65 IN

## 2021-06-01 DIAGNOSIS — N93.9 VAGINAL SPOTTING: ICD-10-CM

## 2021-06-01 DIAGNOSIS — N89.8 VAGINAL DISCHARGE: Primary | ICD-10-CM

## 2021-06-01 DIAGNOSIS — R68.82 DECREASED LIBIDO: ICD-10-CM

## 2021-06-01 PROCEDURE — 99204 OFFICE O/P NEW MOD 45 MIN: CPT | Performed by: OBSTETRICS & GYNECOLOGY

## 2021-06-01 NOTE — PROGRESS NOTES
Hanane Wilhelm is a No obstetric history on file. , 50 y.o. female   No LMP recorded. Patient has had a hysterectomy. She presents for her problem    She is having per pt has been spotting off and on ever since her Hyst in 2010- also notes inability to get an orgasm or decreased libido, denies any  sxs, denies any  or GI sxs. Menstrual status:  Cycles are hysterectomy. Flow: spotting. She does not have dysmenorrhea. Medical conditions:  Since her last annual GYN exam about 10+ years ago, she has not the following changes in her health history: none. Mammogram History:    MITA Results (most recent):  No results found for this or any previous visit. DEXA Results (most recent):  No results found for this or any previous visit. Past Medical History:   Diagnosis Date    Arthritis     Coagulation disorder (HCC)     anemia    Decreased libido     Endocrine disease     Fluid retention     Headache(784.0)     Hypertension     Hypothyroid     Neurological disorder     migraine    Sleep apnea     STATES TESTED, NO MACHINE RECOMMENDED     Past Surgical History:   Procedure Laterality Date    HX OTHER SURGICAL      brain surgery cerberi pseudotumor    HX PARTIAL HYSTERECTOMY      HX TONSILLECTOMY         Prior to Admission medications    Medication Sig Start Date End Date Taking? Authorizing Provider   levothyroxine (SYNTHROID) 150 mcg tablet TAKE 1 TABLET BY MOUTH ONCE DAILY BEFORE BREAKFAST 3/23/21  Yes Claudia Theodore MD   hydroCHLOROthiazide (HYDRODIURIL) 25 mg tablet Take 1 tablet by mouth once daily 3/23/21  Yes Claudia Theodore MD   multivitamin (ONE A DAY) tablet Take 1 Tab by mouth daily.  Turbo power - fortified aloe vera, kym, maqui berry and acai, 47 vitamins enzmes herbs and super nutrients 17 antioxidenta   Yes Provider, Historical   OTHER Joint - clucosamine, chondroitin, msm, vit c hyaluronis acid   Yes Provider, Historical   aspirin delayed-release 81 mg tablet Take  by mouth daily. Yes Provider, Historical   HYDROCODONE BITARTRATE PO Take  by mouth. Yes Provider, Historical   acetaZOLAMIDE SR (DIAMOX) 500 mg capsule Take 1 Cap by mouth every twelve (12) hours. 4/11/16  Yes Liss Momin MD       Allergies   Allergen Reactions    Latex Itching    [place] ([facility] Hives    Other Medication Rash     Tape adhesive          Tobacco History:  reports that she has been smoking. She has a 3.75 pack-year smoking history. She has never used smokeless tobacco.  Alcohol Abuse:  reports current alcohol use. Drug Abuse:  reports no history of drug use. Family Medical/Cancer History:   Family History   Problem Relation Age of Onset    Lupus Mother     Hypertension Mother     Diabetes Father     Stroke Maternal Grandmother     Dementia Maternal Grandmother     Anesth Problems Neg Hx           Review of Systems   Constitutional: Negative for chills, fever, malaise/fatigue and weight loss. HENT: Negative for congestion, ear pain, sinus pain and tinnitus. Eyes: Negative for blurred vision and double vision. Respiratory: Negative for cough, shortness of breath and wheezing. Cardiovascular: Negative for chest pain and palpitations. Gastrointestinal: Negative for abdominal pain, blood in stool, constipation, diarrhea, heartburn, nausea and vomiting. Genitourinary: Negative for dysuria, flank pain, frequency, hematuria and urgency. Musculoskeletal: Negative for joint pain and myalgias. Skin: Negative for itching and rash. Neurological: Negative for dizziness, weakness and headaches. Psychiatric/Behavioral: Negative for depression, memory loss and suicidal ideas. The patient is not nervous/anxious and does not have insomnia. Physical Exam  Constitutional:       Appearance: Normal appearance. HENT:      Head: Normocephalic and atraumatic. Cardiovascular:      Rate and Rhythm: Normal rate. Heart sounds: Normal heart sounds.    Pulmonary: Effort: Pulmonary effort is normal.      Breath sounds: Normal breath sounds. Abdominal:      General: Abdomen is flat. Palpations: Abdomen is soft. Genitourinary:     General: Normal vulva. Vagina: Vaginal discharge present. Uterus: Absent. Adnexa: Right adnexa normal and left adnexa normal.      Rectum: Normal.      Comments: No granulation tissue or prolapsed fallopian tube noted  Neurological:      Mental Status: She is alert. Psychiatric:         Mood and Affect: Mood normal.         Behavior: Behavior normal.         Thought Content: Thought content normal.          Visit Vitals  /74 (BP 1 Location: Left upper arm, BP Patient Position: Sitting, BP Cuff Size: Large adult)   Ht 5' 5\" (1.651 m)   Wt 284 lb (128.8 kg)   BMI 47.26 kg/m²         Assessment: Diagnoses and all orders for this visit:    1. Vaginal spotting    2. Vaginal discharge    3. Decreased libido    4.  BMI 45.0-49.9, adult [facility])        Plan: Questions addressed, Nu-swab obtained- pending results may need US, also DWP Faraz for decreased libido/climax issue  Counseled re: diet, exercise, healthy lifestyle

## 2021-06-01 NOTE — PROGRESS NOTES
Chief Complaint   Patient presents with    New Patient     intermittent bleeding since hyst in 2010     Visit Vitals  /74 (BP 1 Location: Left upper arm, BP Patient Position: Sitting, BP Cuff Size: Large adult)   Ht 5' 5\" (1.651 m)   Wt 284 lb (128.8 kg)   BMI 47.26 kg/m²

## 2021-06-03 LAB
A VAGINAE DNA VAG QL NAA+PROBE: ABNORMAL SCORE
BVAB2 DNA VAG QL NAA+PROBE: ABNORMAL SCORE
C ALBICANS DNA VAG QL NAA+PROBE: NEGATIVE
C GLABRATA DNA VAG QL NAA+PROBE: NEGATIVE
C KRUSEI DNA VAG QL NAA+PROBE: NEGATIVE
C LUSITANIAE DNA VAG QL NAA+PROBE: NEGATIVE
C TRACH DNA VAG QL NAA+PROBE: NEGATIVE
CANDIDA DNA VAG QL NAA+PROBE: NEGATIVE
MEGA1 DNA VAG QL NAA+PROBE: ABNORMAL SCORE
N GONORRHOEA DNA VAG QL NAA+PROBE: NEGATIVE
T VAGINALIS DNA VAG QL NAA+PROBE: POSITIVE

## 2021-06-04 ENCOUNTER — TELEPHONE (OUTPATIENT)
Dept: OBGYN CLINIC | Age: 56
End: 2021-06-04

## 2021-06-04 DIAGNOSIS — B96.89 BACTERIAL VAGINOSIS: Primary | ICD-10-CM

## 2021-06-04 DIAGNOSIS — N76.0 BACTERIAL VAGINOSIS: Primary | ICD-10-CM

## 2021-06-04 DIAGNOSIS — A59.9 TRICHIMONIASIS: ICD-10-CM

## 2021-06-04 RX ORDER — METRONIDAZOLE 500 MG/1
TABLET ORAL
Qty: 18 TABLET | Refills: 0 | Status: SHIPPED | OUTPATIENT
Start: 2021-06-04

## 2021-06-04 NOTE — TELEPHONE ENCOUNTER
Returned the patient's call and she is requesting her lab results. Advised her she is testing positive for a bacterial infection as well as Trich. Prescription sent to the patient's pharmacy and she was advised no intercourse for at least 2 weeks after she and her partner have been treated. The patient is also requesting a prescription for Addyi. Message forwarded to Dr Luis Manuel Peña.

## 2021-06-07 RX ORDER — FLIBANSERIN 100 MG/1
1 TABLET, FILM COATED ORAL DAILY
Qty: 30 TABLET | Refills: 11 | Status: SHIPPED | OUTPATIENT
Start: 2021-06-07

## 2021-06-07 NOTE — PROGRESS NOTES
6/7/21: SHANNAN Nu-swab results  Also SHANNAN Addyi script - script sent to [facility] on [facility]

## 2022-01-27 DIAGNOSIS — E03.9 ACQUIRED HYPOTHYROIDISM: ICD-10-CM

## 2022-01-27 DIAGNOSIS — I10 ESSENTIAL HYPERTENSION: ICD-10-CM

## 2022-01-27 RX ORDER — HYDROCHLOROTHIAZIDE 25 MG/1
25 TABLET ORAL DAILY
Qty: 90 TABLET | Refills: 2 | OUTPATIENT
Start: 2022-01-27

## 2022-01-27 RX ORDER — LEVOTHYROXINE SODIUM 150 UG/1
150 TABLET ORAL
Qty: 90 TABLET | Refills: 0 | Status: SHIPPED | OUTPATIENT
Start: 2022-01-27 | End: 2022-04-20 | Stop reason: SDUPTHER

## 2022-01-27 NOTE — TELEPHONE ENCOUNTER
Notify pt   I can only fill thyroid medication , not HCTZ as she has had no labs or exam ,risky to continue taking without blood work. She can go to [facility] where they can do labs the same day and can fill RX for HCTZ.  Unfortunately we do not have any sooner opening

## 2022-01-28 ENCOUNTER — PATIENT MESSAGE (OUTPATIENT)
Dept: ENDOCRINOLOGY | Age: 57
End: 2022-01-28

## 2022-04-20 ENCOUNTER — OFFICE VISIT (OUTPATIENT)
Dept: ENDOCRINOLOGY | Age: 57
End: 2022-04-20

## 2022-04-20 VITALS
DIASTOLIC BLOOD PRESSURE: 83 MMHG | OXYGEN SATURATION: 100 % | BODY MASS INDEX: 48.82 KG/M2 | HEART RATE: 97 BPM | TEMPERATURE: 97.9 F | RESPIRATION RATE: 18 BRPM | HEIGHT: 65 IN | SYSTOLIC BLOOD PRESSURE: 126 MMHG | WEIGHT: 293 LBS

## 2022-04-20 DIAGNOSIS — E03.9 ACQUIRED HYPOTHYROIDISM: Primary | ICD-10-CM

## 2022-04-20 DIAGNOSIS — E03.9 ACQUIRED HYPOTHYROIDISM: ICD-10-CM

## 2022-04-20 DIAGNOSIS — E23.6 EMPTY SELLA (HCC): ICD-10-CM

## 2022-04-20 PROCEDURE — 99214 OFFICE O/P EST MOD 30 MIN: CPT | Performed by: INTERNAL MEDICINE

## 2022-04-20 NOTE — PROGRESS NOTES
Cecy Friend MD              Patient Information  Date:4/20/2022  Name : Gurney Burkitt 49 y.o.     YOB: 1965         Referred by: Dr Bebeto Avitia      Chief Complaint   Patient presents with    Thyroid Problem       History of Present Illness: Gurney Burkitt is a 49 y.o. female here follow-up  Last seen 2 years ago  History of primary hypothyroidism on replacement levothyroxine. No recent labs  Taking thyroid medication consistently      Found to have partial empty sella on MRI,pituitary hormonal screen normal    12/13  MRI   The enlarged sella turcica is unchanged and predominantly filled with CSF. The pituitary gland is located in the inferior periphery of the sella. No abnormal enhancement. The infundibulum is midline and demonstrates normal signal and caliber. No mass effect on the optic chiasm. The cavernous   sinuses are within normal limits. The cavernous carotid flow-voids are   patent. Optic nerves may be slightly thickened. Subtle flattening of the optic nerve   insertions on the globe. History of primary hypothyroidism on replacement levothyroxine. Family hx thyroid cancer    Wt Readings from Last 3 Encounters:   04/20/22 295 lb (133.8 kg)   06/01/21 284 lb (128.8 kg)   08/28/20 286 lb (129.7 kg)     Past Medical History:   Diagnosis Date    Arthritis     Coagulation disorder (HCC)     anemia    Decreased libido     Endocrine disease     Fluid retention     Headache(784.0)     Hypertension     Hypothyroid     Neurological disorder     migraine    Sleep apnea     STATES TESTED, NO MACHINE RECOMMENDED     Past Surgical History:   Procedure Laterality Date    HX OTHER SURGICAL      brain surgery cerberi pseudotumor    HX PARTIAL HYSTERECTOMY      HX TONSILLECTOMY       Current Outpatient Medications   Medication Sig    levothyroxine (SYNTHROID) 150 mcg tablet Take 1 Tablet by mouth Daily (before breakfast).  No further refills until seen in office    multivitamin (ONE A DAY) tablet Take 1 Tab by mouth daily. Turbo power - fortified aloe vera, kym, maqui berry and acai, 47 vitamins enzmes herbs and super nutrients 17 antioxidenta    aspirin delayed-release 81 mg tablet Take  by mouth daily.  acetaZOLAMIDE SR (DIAMOX) 500 mg capsule Take 1 Cap by mouth every twelve (12) hours.  flibanserin (Addyi) 100 mg tab Take 1 Tablet by mouth daily. (Patient not taking: Reported on 4/20/2022)    metroNIDAZOLE (FLAGYL) 500 mg tablet Take 4 tablets as a one time dose on day 1 and then take 1 tablet twice a day for the next 7 days (Patient not taking: Reported on 4/20/2022)    hydroCHLOROthiazide (HYDRODIURIL) 25 mg tablet Take 1 tablet by mouth once daily (Patient not taking: Reported on 4/20/2022)    OTHER Joint - clucosamine, chondroitin, msm, vit c hyaluronis acid (Patient not taking: Reported on 4/20/2022)    HYDROCODONE BITARTRATE PO Take  by mouth. (Patient not taking: Reported on 4/20/2022)     No current facility-administered medications for this visit. Allergies   Allergen Reactions    Latex Itching    [place] Hives    Other Medication Rash     Tape adhesive         Review of Systems:  - Per HPI    Physical Examination:  - Blood pressure 126/83, pulse 97, temperature 97.9 °F (36.6 °C), temperature source Temporal, resp. rate 18, height 5' 5\" (1.651 m), weight 295 lb (133.8 kg), SpO2 100 %. Body mass index is 49.09 kg/m².   - General: pleasant, no distress, good eye contact  - HEENT: no exopthalmos, no periorbital edema, no scleral/conjunctival injection, EOMI, no lid lag or stare,no sinus tenderness  - Neck: supple, no thyromegaly  - Cardiovascular: regular, normal rate, normal S1 and S2  - Respiratory: clear to auscultation bilaterally  - Musculoskeletal:   no rashes, no edema,  - Neurological: Alert and oriented x3, no focal deficits  - Psychiatric: normal mood and affect    Data Reviewed:     Lab Results   Component Value Date/Time    WBC 12.7 (H) 02/17/2016 05:12 AM    Hemoglobin (POC) 13.3 02/16/2016 06:40 AM    HGB 12.3 02/17/2016 05:12 AM    Hematocrit (POC) 39 02/16/2016 06:40 AM    HCT 37.4 02/17/2016 05:12 AM    PLATELET 050 91/91/8778 05:12 AM    MCV 91.0 02/17/2016 05:12 AM     Lab Results   Component Value Date/Time    ALT (SGPT) 18 07/07/2017 10:55 AM    Alk. phosphatase 76 07/07/2017 10:55 AM    Bilirubin, total 0.3 07/07/2017 10:55 AM     Lab Results   Component Value Date/Time    GFR est AA >60 08/28/2020 12:59 PM    GFR est non-AA 59 (L) 08/28/2020 12:59 PM    Creatinine (POC) 0.9 02/16/2016 06:40 AM    Creatinine 0.99 08/28/2020 12:59 PM    BUN 19 08/28/2020 12:59 PM    BUN (POC) 17 02/16/2016 06:40 AM    Sodium (POC) 141 02/16/2016 06:40 AM    Sodium 140 08/28/2020 12:59 PM    Potassium 3.8 08/28/2020 12:59 PM    Potassium (POC) 3.4 (L) 02/16/2016 06:40 AM    Chloride (POC) 103 02/16/2016 06:40 AM    Chloride 107 08/28/2020 12:59 PM    CO2 28 08/28/2020 12:59 PM      Lab Results   Component Value Date/Time    TSH 0.24 (L) 08/28/2020 12:59 PM    T4, Free 1.5 08/28/2020 12:59 PM      Lab Results   Component Value Date/Time    Glucose 108 (H) 08/28/2020 12:59 PM    Glucose (POC) 113 (H) 02/19/2016 11:46 AM        Assessment/Plan:      Partial empty sella   Pituitary hormonal screen normal 2/2014  No hypopituitarism  Labs today    Primary hypothyroidism-  Levothyroxine   Recheck TSH    Vasomotor symptoms of menopause: Not bad      History of pseudo-tumor cerebral -FU with neurology/ENT               Thank you for allowing me to participate in the care of this patient.     Becka Shook MD    Patient verbalized understanding

## 2022-04-20 NOTE — PROGRESS NOTES
Carl Sanchez is a 49 y.o. female here for   Chief Complaint   Patient presents with    Thyroid Problem       1. Have you been to the ER, urgent care clinic since your last visit? Hospitalized since your last visit? -    2. Have you seen or consulted any other health care providers outside of the [address] [place] [address] since your last visit?   Include any pap smears or colon screening.-

## 2022-04-21 LAB
ANION GAP SERPL CALC-SCNC: 5 MMOL/L (ref 5–15)
BUN SERPL-MCNC: 19 MG/DL (ref 6–20)
BUN/CREAT SERPL: 18 (ref 12–20)
CALCIUM SERPL-MCNC: 9.5 MG/DL (ref 8.5–10.1)
CHLORIDE SERPL-SCNC: 112 MMOL/L (ref 97–108)
CO2 SERPL-SCNC: 24 MMOL/L (ref 21–32)
CREAT SERPL-MCNC: 1.05 MG/DL (ref 0.55–1.02)
GLUCOSE SERPL-MCNC: 88 MG/DL (ref 65–100)
POTASSIUM SERPL-SCNC: 4.5 MMOL/L (ref 3.5–5.1)
PROLACTIN SERPL-MCNC: 6.9 NG/ML
SODIUM SERPL-SCNC: 141 MMOL/L (ref 136–145)
T4 FREE SERPL-MCNC: 1.2 NG/DL (ref 0.8–1.5)
TSH SERPL DL<=0.05 MIU/L-ACNC: 1.71 UIU/ML (ref 0.36–3.74)

## 2022-04-21 RX ORDER — LEVOTHYROXINE SODIUM 150 UG/1
150 TABLET ORAL
Qty: 90 TABLET | Refills: 3 | Status: SHIPPED | OUTPATIENT
Start: 2022-04-21

## 2022-11-07 ENCOUNTER — TELEPHONE (OUTPATIENT)
Dept: ENDOCRINOLOGY | Age: 57
End: 2022-11-07

## 2022-11-07 NOTE — TELEPHONE ENCOUNTER
Patient is wanting to discuss change in thyroid medication. She says noone informed her of the change and why. She has been on levothyroxine and now filled as unithroid and it breaks her out.  She would like a call back soon having adverse reaction

## 2022-11-07 NOTE — TELEPHONE ENCOUNTER
Informed pt that we did not change rx. Last time we sent the rx was in April and we would notify pt if changes were made. Pt stated pharmacy gave her Unithroid and she cannot tolerate it. It dries her skin out and causes peeling. Informed her that I will call the pharmacy. Called [facility] and pharmacist stated that likely they were out of stock of the levothyroxine and that's why it was switched. She put a note on pt's profile that pt is allergic to the Unithroid so it won't happen again. They will also get levothyroxine ready for pt.

## 2023-05-17 ENCOUNTER — TELEPHONE (OUTPATIENT)
Age: 58
End: 2023-05-17

## 2023-05-17 DIAGNOSIS — E03.9 HYPOTHYROIDISM, UNSPECIFIED TYPE: Primary | ICD-10-CM

## 2023-05-18 RX ORDER — LEVOTHYROXINE SODIUM 13 UG/1
CAPSULE ORAL
Qty: 90 CAPSULE | Refills: 3 | Status: SHIPPED | OUTPATIENT
Start: 2023-05-18 | End: 2023-05-19 | Stop reason: SDUPTHER

## 2023-05-19 ENCOUNTER — TELEPHONE (OUTPATIENT)
Age: 58
End: 2023-05-19

## 2023-05-19 DIAGNOSIS — E03.9 HYPOTHYROIDISM, UNSPECIFIED TYPE: Primary | ICD-10-CM

## 2023-05-19 RX ORDER — LEVOTHYROXINE SODIUM 0.15 MG/1
150 TABLET ORAL DAILY
Qty: 90 TABLET | Refills: 3 | Status: SHIPPED | OUTPATIENT
Start: 2023-05-19

## 2023-08-30 ENCOUNTER — APPOINTMENT (OUTPATIENT)
Facility: HOSPITAL | Age: 58
End: 2023-08-30
Payer: MEDICAID

## 2023-08-30 ENCOUNTER — HOSPITAL ENCOUNTER (EMERGENCY)
Facility: HOSPITAL | Age: 58
Discharge: HOME | End: 2023-08-31
Attending: STUDENT IN AN ORGANIZED HEALTH CARE EDUCATION/TRAINING PROGRAM
Payer: MEDICAID

## 2023-08-30 DIAGNOSIS — R55 VASOVAGAL SYNCOPE: ICD-10-CM

## 2023-08-30 DIAGNOSIS — E87.6 HYPOKALEMIA: ICD-10-CM

## 2023-08-30 DIAGNOSIS — K04.7 PERIAPICAL ABSCESS: Primary | ICD-10-CM

## 2023-08-30 LAB
ALBUMIN SERPL-MCNC: 3.4 G/DL (ref 3.5–5)
ALBUMIN/GLOB SERPL: 0.7 (ref 1.1–2.2)
ALP SERPL-CCNC: 92 U/L (ref 45–117)
ALT SERPL-CCNC: 20 U/L (ref 12–78)
ANION GAP SERPL CALC-SCNC: 5 MMOL/L (ref 5–15)
AST SERPL W P-5'-P-CCNC: 10 U/L (ref 15–37)
BASE DEFICIT BLD-SCNC: 4.5 MMOL/L
BASOPHILS # BLD: 0 K/UL (ref 0–0.1)
BASOPHILS NFR BLD: 0 % (ref 0–1)
BILIRUB SERPL-MCNC: 0.4 MG/DL (ref 0.2–1)
BUN SERPL-MCNC: 13 MG/DL (ref 6–20)
BUN/CREAT SERPL: 9 (ref 12–20)
CA-I BLD-MCNC: 1.17 MMOL/L (ref 1.12–1.32)
CA-I BLD-MCNC: 8.6 MG/DL (ref 8.5–10.1)
CHLORIDE BLD-SCNC: 112 MMOL/L (ref 98–107)
CHLORIDE SERPL-SCNC: 110 MMOL/L (ref 97–108)
CO2 BLD-SCNC: 22 MMOL/L
CO2 SERPL-SCNC: 23 MMOL/L (ref 21–32)
CREAT SERPL-MCNC: 1.41 MG/DL (ref 0.55–1.02)
CREAT UR-MCNC: 0.96 MG/DL (ref 0.6–1.3)
DIFFERENTIAL METHOD BLD: ABNORMAL
EOSINOPHIL # BLD: 0 K/UL (ref 0–0.4)
EOSINOPHIL NFR BLD: 0 % (ref 0–7)
ERYTHROCYTE [DISTWIDTH] IN BLOOD BY AUTOMATED COUNT: 14.1 % (ref 11.5–14.5)
GLOBULIN SER CALC-MCNC: 4.8 G/DL (ref 2–4)
GLUCOSE BLD STRIP.AUTO-MCNC: 153 MG/DL (ref 65–100)
GLUCOSE SERPL-MCNC: 155 MG/DL (ref 65–100)
HCO3 BLD-SCNC: 21.6 MMOL/L (ref 19–28)
HCT VFR BLD AUTO: 42.5 % (ref 35–47)
HGB BLD-MCNC: 13.7 G/DL (ref 11.5–16)
IMM GRANULOCYTES # BLD AUTO: 0 K/UL (ref 0–0.04)
IMM GRANULOCYTES NFR BLD AUTO: 0 % (ref 0–0.5)
LACTATE BLD-SCNC: 0.99 MMOL/L (ref 0.4–2)
LYMPHOCYTES # BLD: 2.1 K/UL (ref 0.8–3.5)
LYMPHOCYTES NFR BLD: 20 % (ref 12–49)
MCH RBC QN AUTO: 29.5 PG (ref 26–34)
MCHC RBC AUTO-ENTMCNC: 32.2 G/DL (ref 30–36.5)
MCV RBC AUTO: 91.6 FL (ref 80–99)
MONOCYTES # BLD: 0.9 K/UL (ref 0–1)
MONOCYTES NFR BLD: 8 % (ref 5–13)
NEUTS SEG # BLD: 7.4 K/UL (ref 1.8–8)
NEUTS SEG NFR BLD: 72 % (ref 32–75)
NRBC # BLD: 0 K/UL (ref 0–0.01)
NRBC BLD-RTO: 0 PER 100 WBC
PCO2 BLD: 42.5 MMHG (ref 35–45)
PERFORMED BY:: ABNORMAL
PH BLD: 7.31 (ref 7.35–7.45)
PLATELET # BLD AUTO: 135 K/UL (ref 150–400)
PMV BLD AUTO: 12.4 FL (ref 8.9–12.9)
PO2 BLD: 34 MMHG (ref 75–100)
POTASSIUM BLD-SCNC: 4 MMOL/L (ref 3.5–5.5)
POTASSIUM SERPL-SCNC: 3.4 MMOL/L (ref 3.5–5.1)
PROT SERPL-MCNC: 8.2 G/DL (ref 6.4–8.2)
RBC # BLD AUTO: 4.64 M/UL (ref 3.8–5.2)
SAO2 % BLD: 61 %
SODIUM BLD-SCNC: 142 MMOL/L (ref 136–145)
SODIUM SERPL-SCNC: 138 MMOL/L (ref 136–145)
SPECIMEN SITE: ABNORMAL
WBC # BLD AUTO: 10.4 K/UL (ref 3.6–11)

## 2023-08-30 PROCEDURE — 82947 ASSAY GLUCOSE BLOOD QUANT: CPT

## 2023-08-30 PROCEDURE — 96365 THER/PROPH/DIAG IV INF INIT: CPT

## 2023-08-30 PROCEDURE — 82330 ASSAY OF CALCIUM: CPT

## 2023-08-30 PROCEDURE — 83605 ASSAY OF LACTIC ACID: CPT

## 2023-08-30 PROCEDURE — 86140 C-REACTIVE PROTEIN: CPT

## 2023-08-30 PROCEDURE — 96375 TX/PRO/DX INJ NEW DRUG ADDON: CPT

## 2023-08-30 PROCEDURE — 83690 ASSAY OF LIPASE: CPT

## 2023-08-30 PROCEDURE — 82803 BLOOD GASES ANY COMBINATION: CPT

## 2023-08-30 PROCEDURE — 84295 ASSAY OF SERUM SODIUM: CPT

## 2023-08-30 PROCEDURE — 85025 COMPLETE CBC W/AUTO DIFF WBC: CPT

## 2023-08-30 PROCEDURE — 99285 EMERGENCY DEPT VISIT HI MDM: CPT

## 2023-08-30 PROCEDURE — 84484 ASSAY OF TROPONIN QUANT: CPT

## 2023-08-30 PROCEDURE — 83735 ASSAY OF MAGNESIUM: CPT

## 2023-08-30 PROCEDURE — 93005 ELECTROCARDIOGRAM TRACING: CPT | Performed by: STUDENT IN AN ORGANIZED HEALTH CARE EDUCATION/TRAINING PROGRAM

## 2023-08-30 PROCEDURE — 80053 COMPREHEN METABOLIC PANEL: CPT

## 2023-08-30 PROCEDURE — 85652 RBC SED RATE AUTOMATED: CPT

## 2023-08-30 PROCEDURE — 70470 CT HEAD/BRAIN W/O & W/DYE: CPT

## 2023-08-30 PROCEDURE — 84132 ASSAY OF SERUM POTASSIUM: CPT

## 2023-08-30 PROCEDURE — 87040 BLOOD CULTURE FOR BACTERIA: CPT

## 2023-08-30 PROCEDURE — 36415 COLL VENOUS BLD VENIPUNCTURE: CPT

## 2023-08-30 PROCEDURE — 84145 PROCALCITONIN (PCT): CPT

## 2023-08-30 RX ORDER — POTASSIUM CHLORIDE 1.5 G/1.58G
40 POWDER, FOR SOLUTION ORAL ONCE
Status: DISCONTINUED | OUTPATIENT
Start: 2023-08-30 | End: 2023-08-31 | Stop reason: CLARIF

## 2023-08-30 RX ORDER — 0.9 % SODIUM CHLORIDE 0.9 %
1000 INTRAVENOUS SOLUTION INTRAVENOUS ONCE
Status: COMPLETED | OUTPATIENT
Start: 2023-08-30 | End: 2023-08-31

## 2023-08-31 VITALS
SYSTOLIC BLOOD PRESSURE: 121 MMHG | RESPIRATION RATE: 22 BRPM | OXYGEN SATURATION: 99 % | HEART RATE: 94 BPM | TEMPERATURE: 98.8 F | DIASTOLIC BLOOD PRESSURE: 99 MMHG | HEIGHT: 65 IN | WEIGHT: 260 LBS | BODY MASS INDEX: 43.32 KG/M2

## 2023-08-31 LAB
APPEARANCE UR: CLEAR
BACTERIA URNS QL MICRO: NEGATIVE /HPF
BILIRUB UR QL: NEGATIVE
COLOR UR: NORMAL
CRP SERPL-MCNC: 6.79 MG/DL (ref 0–0.6)
EKG ATRIAL RATE: 93 BPM
EKG DIAGNOSIS: NORMAL
EKG P AXIS: 50 DEGREES
EKG P-R INTERVAL: 154 MS
EKG Q-T INTERVAL: 300 MS
EKG QRS DURATION: 84 MS
EKG QTC CALCULATION (BAZETT): 373 MS
EKG R AXIS: 25 DEGREES
EKG T AXIS: 32 DEGREES
EKG VENTRICULAR RATE: 93 BPM
EPITH CASTS URNS QL MICRO: NORMAL /LPF
ERYTHROCYTE [SEDIMENTATION RATE] IN BLOOD: 34 MM/HR (ref 0–30)
GLUCOSE UR STRIP.AUTO-MCNC: NEGATIVE MG/DL
HGB UR QL STRIP: NEGATIVE
KETONES UR QL STRIP.AUTO: NEGATIVE MG/DL
LACTATE SERPL-SCNC: 1.2 MMOL/L (ref 0.4–2)
LEUKOCYTE ESTERASE UR QL STRIP.AUTO: NEGATIVE
LIPASE SERPL-CCNC: 85 U/L (ref 73–393)
MAGNESIUM SERPL-MCNC: 2 MG/DL (ref 1.6–2.4)
NITRITE UR QL STRIP.AUTO: NEGATIVE
PH UR STRIP: 7 (ref 5–8)
PROCALCITONIN SERPL-MCNC: <0.05 NG/ML
PROT UR STRIP-MCNC: NEGATIVE MG/DL
RBC #/AREA URNS HPF: NORMAL /HPF (ref 0–5)
SP GR UR REFRACTOMETRY: 1.03 (ref 1–1.03)
TROPONIN I SERPL HS-MCNC: 26 NG/L (ref 0–51)
TROPONIN I SERPL HS-MCNC: 26 NG/L (ref 0–51)
URINE CULTURE IF INDICATED: NORMAL
UROBILINOGEN UR QL STRIP.AUTO: 0.1 EU/DL (ref 0.1–1)
WBC URNS QL MICRO: NORMAL /HPF (ref 0–4)

## 2023-08-31 PROCEDURE — 83605 ASSAY OF LACTIC ACID: CPT

## 2023-08-31 PROCEDURE — 2580000003 HC RX 258: Performed by: STUDENT IN AN ORGANIZED HEALTH CARE EDUCATION/TRAINING PROGRAM

## 2023-08-31 PROCEDURE — 6360000004 HC RX CONTRAST MEDICATION: Performed by: STUDENT IN AN ORGANIZED HEALTH CARE EDUCATION/TRAINING PROGRAM

## 2023-08-31 PROCEDURE — 6370000000 HC RX 637 (ALT 250 FOR IP): Performed by: STUDENT IN AN ORGANIZED HEALTH CARE EDUCATION/TRAINING PROGRAM

## 2023-08-31 PROCEDURE — 36415 COLL VENOUS BLD VENIPUNCTURE: CPT

## 2023-08-31 PROCEDURE — 6360000002 HC RX W HCPCS: Performed by: STUDENT IN AN ORGANIZED HEALTH CARE EDUCATION/TRAINING PROGRAM

## 2023-08-31 PROCEDURE — 81001 URINALYSIS AUTO W/SCOPE: CPT

## 2023-08-31 PROCEDURE — 84484 ASSAY OF TROPONIN QUANT: CPT

## 2023-08-31 RX ORDER — AMOXICILLIN AND CLAVULANATE POTASSIUM 875; 125 MG/1; MG/1
1 TABLET, FILM COATED ORAL 2 TIMES DAILY
Qty: 20 TABLET | Refills: 0 | Status: SHIPPED | OUTPATIENT
Start: 2023-08-31 | End: 2023-09-10

## 2023-08-31 RX ORDER — IBUPROFEN 600 MG/1
600 TABLET ORAL EVERY 8 HOURS PRN
Qty: 20 TABLET | Refills: 0 | Status: SHIPPED | OUTPATIENT
Start: 2023-08-31

## 2023-08-31 RX ORDER — KETOROLAC TROMETHAMINE 15 MG/ML
15 INJECTION, SOLUTION INTRAMUSCULAR; INTRAVENOUS ONCE
Status: COMPLETED | OUTPATIENT
Start: 2023-08-31 | End: 2023-08-31

## 2023-08-31 RX ORDER — DEXAMETHASONE SODIUM PHOSPHATE 10 MG/ML
6 INJECTION, SOLUTION INTRAMUSCULAR; INTRAVENOUS ONCE
Status: COMPLETED | OUTPATIENT
Start: 2023-08-31 | End: 2023-08-31

## 2023-08-31 RX ORDER — HYDROCODONE BITARTRATE AND ACETAMINOPHEN 5; 325 MG/1; MG/1
1 TABLET ORAL EVERY 6 HOURS PRN
Qty: 12 TABLET | Refills: 0 | Status: SHIPPED | OUTPATIENT
Start: 2023-08-31 | End: 2023-09-03

## 2023-08-31 RX ADMIN — SODIUM CHLORIDE 3000 MG: 900 INJECTION INTRAVENOUS at 00:25

## 2023-08-31 RX ADMIN — POTASSIUM BICARBONATE 40 MEQ: 782 TABLET, EFFERVESCENT ORAL at 00:26

## 2023-08-31 RX ADMIN — IOPAMIDOL 100 ML: 755 INJECTION, SOLUTION INTRAVENOUS at 00:13

## 2023-08-31 RX ADMIN — DEXAMETHASONE SODIUM PHOSPHATE 6 MG: 10 INJECTION, SOLUTION INTRAMUSCULAR; INTRAVENOUS at 01:43

## 2023-08-31 RX ADMIN — KETOROLAC TROMETHAMINE 15 MG: 15 INJECTION, SOLUTION INTRAMUSCULAR; INTRAVENOUS at 01:43

## 2023-08-31 RX ADMIN — SODIUM CHLORIDE 1000 ML: 9 INJECTION, SOLUTION INTRAVENOUS at 00:27

## 2023-08-31 NOTE — DISCHARGE INSTRUCTIONS
Specimen: Urine   Result Value Ref Range    Color, UA Yellow/Straw      Appearance Clear Clear      Specific Gravity, UA 1.028 1.003 - 1.030      pH, Urine 7.0 5.0 - 8.0      Protein, UA Negative Negative mg/dL    Glucose, UA Negative Negative mg/dL    Ketones, Urine Negative Negative mg/dL    Bilirubin Urine Negative Negative      Blood, Urine Negative Negative      Urobilinogen, Urine 0.1 0.1 - 1.0 EU/dL    Nitrite, Urine Negative Negative      Leukocyte Esterase, Urine Negative Negative      WBC, UA 0-4 0 - 4 /hpf    RBC, UA 0-5 0 - 5 /hpf    Epithelial Cells UA Few Few /lpf    BACTERIA, URINE Negative Negative /hpf    Urine Culture if Indicated Culture not indicated by UA result Culture not indicated by UA result     Lactate, Sepsis    Collection Time: 08/31/23  1:18 AM   Result Value Ref Range    Lactic Acid, Sepsis 1.2 0.4 - 2.0 mmol/L       Radiologic Studies -   CT HEAD W WO CONTRAST   Final Result   No acute intracranial process. There is no intracranial mass or hemorrhage. Minimal inflammatory change and edema overlies the left maxilla/left orbit. No   loculated collection is identified. There is a transverse sinus stent on the left. If you feel that you have not received excellent quality care or timely care, please ask to speak to the nurse manager. Please choose us in the future for your continued health care needs. ------------------------------------------------------------------------------------------------------------  The exam and treatment you received in the Emergency Department were for an urgent problem and are not intended as complete care. It is important that you follow-up with a doctor, nurse practitioner, or physician assistant to:  (1) confirm your diagnosis,  (2) re-evaluation of changes in your illness and treatment, and  (3) for ongoing care.   If your symptoms become worse or you do not improve as expected and you are unable to reach your usual health care

## 2023-08-31 NOTE — ED TRIAGE NOTES
Patients family found her unresponsive on the floor, family helped her onto the couch and she had another episode. Patient was seen at another facility earlier today for a tooth abscess and facial swelling. Patient takes ASA daily, no thinners.

## 2023-08-31 NOTE — ED PROVIDER NOTES
[address] [facility] [address]  EMERGENCY DEPARTMENT ENCOUNTER NOTE    Date: 8/30/2023  Patient Name: Dom Simon    History of Presenting Illness     Chief Complaint   Patient presents with    Loss of Consciousness       History obtained from: Patient    HPI: Dom Simon, 56 y.o. female with past medical history as listed and reviewed below presents for an episode of presyncope. She was at [facility] earlier today for a left maxillary tooth infection with some eye swelling. She was given clindamycin and discharged home. She has not been able to fill her prescriptions. Prior to presentation, she had an episode where she felt \"funny\" describing a feeling of lightheadedness, nausea, and feeling as if she is going to pass out. She had some diaphoresis, lowered herself to the ground, did not have complete syncope, and did not have any seizure-like episodes. She stayed on the floor for around 30 minutes before being able to be helped back up. She is complaining of a mild headache, some mild intermittent vision changes. She reports feeling the same way the last time she had a stent placed for her idiopathic intracranial hypertension. She denies any weakness or numbness in the upper or lower extremities. No chest pain or shortness of breath. No abdominal pain, nausea, or vomiting. No fevers or chills. No neck stiffness. Medical History   I reviewed the medical, surgical, family, and social history, as well as allergies:    PCP: No primary care provider on file.     Past Medical History:  Past Medical History:   Diagnosis Date    Arthritis     Coagulation disorder ([address])     anemia    Decreased libido     Endocrine disease     Fluid retention     Headache(784.0)     Hypertension     Hypothyroid     Neurological disorder     migraine    Sleep apnea     STATES TESTED, NO MACHINE RECOMMENDED     Past Surgical History:  Past Surgical History:   Procedure Laterality Date    OTHER SURGICAL

## 2023-09-03 LAB
BACTERIA SPEC CULT: NORMAL
BACTERIA SPEC CULT: NORMAL
Lab: NORMAL
Lab: NORMAL

## 2023-09-05 LAB
BACTERIA SPEC CULT: NORMAL
BACTERIA SPEC CULT: NORMAL
Lab: NORMAL
Lab: NORMAL

## 2023-10-09 ENCOUNTER — OFFICE VISIT (OUTPATIENT)
Age: 58
End: 2023-10-09
Payer: COMMERCIAL

## 2023-10-09 VITALS
HEART RATE: 98 BPM | HEIGHT: 65 IN | OXYGEN SATURATION: 97 % | BODY MASS INDEX: 48.82 KG/M2 | WEIGHT: 293 LBS | TEMPERATURE: 97.6 F | RESPIRATION RATE: 18 BRPM | SYSTOLIC BLOOD PRESSURE: 126 MMHG | DIASTOLIC BLOOD PRESSURE: 84 MMHG

## 2023-10-09 DIAGNOSIS — L68.0 HIRSUTISM: ICD-10-CM

## 2023-10-09 DIAGNOSIS — E66.01 MORBID OBESITY (HCC): ICD-10-CM

## 2023-10-09 DIAGNOSIS — E03.9 HYPOTHYROIDISM, UNSPECIFIED TYPE: Primary | ICD-10-CM

## 2023-10-09 PROCEDURE — 99214 OFFICE O/P EST MOD 30 MIN: CPT | Performed by: INTERNAL MEDICINE

## 2023-10-09 PROCEDURE — 3078F DIAST BP <80 MM HG: CPT | Performed by: INTERNAL MEDICINE

## 2023-10-09 PROCEDURE — 3074F SYST BP LT 130 MM HG: CPT | Performed by: INTERNAL MEDICINE

## 2023-10-09 RX ORDER — DEXAMETHASONE 1 MG
TABLET ORAL
Qty: 1 TABLET | Refills: 0 | Status: SHIPPED | OUTPATIENT
Start: 2023-10-09

## 2023-10-09 NOTE — PROGRESS NOTES
Sharif Mullen is a 56 y.o. female here for   Chief Complaint   Patient presents with    Thyroid Problem     Hypothyroidism       1. Have you been to the ER, urgent care clinic since your last visit? Hospitalized since your last visit? - Omayra Phipps; 8/30/2023; abscess, [facility]; 8/30/2023; syncope     2. Have you seen or consulted any other health care providers outside of the [address] [place] [address] since your last visit?   Include any pap smears or colon screening.- no

## 2023-10-09 NOTE — PROGRESS NOTES
Chantel Ledezma MD                     Patient Information   Date:4/20/2022   Name : Loli Mar 49 y.o.       YOB: 1965           Referred by: Dr Bayron Navarro        Chief Complaint   Patient presents with    Thyroid Problem     Hypothyroidism               History of Present Illness: Loli Mar is a  49 y.o. female here follow-up  Follow-up is very poor with me, last visit was more than a year ago, no labs     History of primary hypothyroidism on replacement levothyroxine. She ran out of hydrochlorothiazide, requesting prescription,         Found to have partial empty sella on MRI,pituitary hormonal screen normal      12/13  MRI    The enlarged sella turcica is unchanged and predominantly filled with CSF. The pituitary gland is located in the inferior periphery of the sella. No abnormal  enhancement. The infundibulum is midline and demonstrates normal signal and caliber. No mass effect on the optic chiasm. The cavernous    sinuses are within normal limits. The cavernous carotid flow-voids are    patent. Optic nerves may be slightly thickened. Subtle flattening of the optic nerve    insertions on the globe. History of primary hypothyroidism on replacement levothyroxine.       Family hx thyroid cancer              Physical Examination:      General: pleasant, no distress, good eye contact    HEENT: no exopthalmos, no periorbital edema, no scleral/conjunctival injection, EOMI, no lid lag or stare,no sinus tenderness    Neck: supple, no thyromegaly    Cardiovascular: regular, normal rate, normal S1 and S2    Respiratory: clear to auscultation bilaterally    Musculoskeletal:   no rashes, no edema,    Neurological: Alert and oriented x3, no focal deficits    Psychiatric: normal mood and affect      Data Reviewed:             Lab Results   Component Value Date    TSH 1.71 04/20/2022    [id number] 0.91 10/09/2023    T4FREE 1.2 10/09/2023

## 2023-10-10 LAB
ANION GAP SERPL CALC-SCNC: 4 MMOL/L (ref 5–15)
BUN SERPL-MCNC: 13 MG/DL (ref 6–20)
BUN/CREAT SERPL: 13 (ref 12–20)
CALCIUM SERPL-MCNC: 8.9 MG/DL (ref 8.5–10.1)
CHLORIDE SERPL-SCNC: 112 MMOL/L (ref 97–108)
CO2 SERPL-SCNC: 24 MMOL/L (ref 21–32)
CREAT SERPL-MCNC: 1 MG/DL (ref 0.55–1.02)
GLUCOSE SERPL-MCNC: 108 MG/DL (ref 65–100)
POTASSIUM SERPL-SCNC: 4.2 MMOL/L (ref 3.5–5.1)
SODIUM SERPL-SCNC: 140 MMOL/L (ref 136–145)
T4 FREE SERPL-MCNC: 1.2 NG/DL (ref 0.8–1.5)
TSH SERPL DL<=0.05 MIU/L-ACNC: 0.91 UIU/ML (ref 0.36–3.74)

## 2023-10-11 LAB
DHEA-S SERPL-MCNC: 34.8 UG/DL (ref 29.4–220.5)
TESTOST SERPL-MCNC: 27.4 NG/DL

## 2023-10-11 RX ORDER — HYDROCHLOROTHIAZIDE 25 MG/1
25 TABLET ORAL DAILY
Qty: 30 TABLET | Refills: 3 | Status: SHIPPED | OUTPATIENT
Start: 2023-10-11 | End: 2023-10-14

## 2023-10-12 ENCOUNTER — TELEPHONE (OUTPATIENT)
Age: 58
End: 2023-10-12

## 2023-10-12 NOTE — TELEPHONE ENCOUNTER
Patient called yelling about a pill she did not ask for. Hydrochlorothiazide. Per dr Stanley Porter. If She does not want the med to disregard. It is at the most importance that she have a PCP to address these meds. She agreed not to . And stated she does not want it.

## 2023-10-13 NOTE — TELEPHONE ENCOUNTER
I was told she needs refill on hydrochlorothiazide, she was on hydrochlorothiazide since 2021 and hence hydrochlorothiazide was sent

## 2023-11-30 SDOH — HEALTH STABILITY: PHYSICAL HEALTH: ON AVERAGE, HOW MANY DAYS PER WEEK DO YOU ENGAGE IN MODERATE TO STRENUOUS EXERCISE (LIKE A BRISK WALK)?: 0 DAYS

## 2023-11-30 SDOH — HEALTH STABILITY: PHYSICAL HEALTH: ON AVERAGE, HOW MANY MINUTES DO YOU ENGAGE IN EXERCISE AT THIS LEVEL?: 0 MIN

## 2023-12-01 ENCOUNTER — OFFICE VISIT (OUTPATIENT)
Facility: CLINIC | Age: 58
End: 2023-12-01
Payer: COMMERCIAL

## 2023-12-01 VITALS
RESPIRATION RATE: 20 BRPM | DIASTOLIC BLOOD PRESSURE: 80 MMHG | WEIGHT: 293 LBS | BODY MASS INDEX: 48.82 KG/M2 | HEART RATE: 88 BPM | OXYGEN SATURATION: 98 % | SYSTOLIC BLOOD PRESSURE: 130 MMHG | TEMPERATURE: 97.1 F | HEIGHT: 65 IN

## 2023-12-01 DIAGNOSIS — Z76.89 ENCOUNTER TO ESTABLISH CARE: Primary | ICD-10-CM

## 2023-12-01 DIAGNOSIS — Z76.89 ENCOUNTER FOR WEIGHT MANAGEMENT: ICD-10-CM

## 2023-12-01 DIAGNOSIS — J06.9 VIRAL URI: ICD-10-CM

## 2023-12-01 DIAGNOSIS — Z13.31 DEPRESSION SCREEN: ICD-10-CM

## 2023-12-01 DIAGNOSIS — I10 PRIMARY HYPERTENSION: ICD-10-CM

## 2023-12-01 PROCEDURE — 99204 OFFICE O/P NEW MOD 45 MIN: CPT

## 2023-12-01 PROCEDURE — 3078F DIAST BP <80 MM HG: CPT

## 2023-12-01 PROCEDURE — 3074F SYST BP LT 130 MM HG: CPT

## 2023-12-01 RX ORDER — PREDNISONE 10 MG/1
10 TABLET ORAL 2 TIMES DAILY
Qty: 10 TABLET | Refills: 0 | Status: SHIPPED | OUTPATIENT
Start: 2023-12-01 | End: 2023-12-06

## 2023-12-01 RX ORDER — ACETAZOLAMIDE 250 MG/1
500 TABLET ORAL DAILY
COMMUNITY
Start: 2023-11-21

## 2023-12-01 RX ORDER — OXYCODONE HYDROCHLORIDE AND ACETAMINOPHEN 5; 325 MG/1; MG/1
1 TABLET ORAL EVERY 4 HOURS PRN
COMMUNITY

## 2023-12-01 RX ORDER — CETIRIZINE HYDROCHLORIDE 10 MG/1
10 TABLET ORAL DAILY
COMMUNITY

## 2023-12-01 ASSESSMENT — PATIENT HEALTH QUESTIONNAIRE - PHQ9
SUM OF ALL RESPONSES TO PHQ QUESTIONS 1-9: 0
SUM OF ALL RESPONSES TO PHQ QUESTIONS 1-9: 0
2. FEELING DOWN, DEPRESSED OR HOPELESS: 0
SUM OF ALL RESPONSES TO PHQ9 QUESTIONS 1 & 2: 0
SUM OF ALL RESPONSES TO PHQ QUESTIONS 1-9: 0
1. LITTLE INTEREST OR PLEASURE IN DOING THINGS: 0
SUM OF ALL RESPONSES TO PHQ QUESTIONS 1-9: 0

## 2023-12-01 NOTE — PROGRESS NOTES
Subjective  Chief Complaint   Patient presents with    New Patient     Patient here to establish care. Patient c/o severe coughing due to allergies. HPI:  Quin Lozada is a 66 y.o. female, new patient, who presents to establish care. Established Care:  Unknown if her pap was removed (may have had a pap in )  Unknown about OBGYN  Endocrinology - following for levothyroxine   ENT - Darshan Bream - giving diamox    Declines - breast cancer screening and colon cancer screening. Covid Vaccines: got the first 2     Smokes sometimes, but had to quit with stress    Cough: Onset: 2 weeks ago. Denies any body aches, chills, fever, SOB or chest pain. Runny nose, cough)  Allergies - takes zyrtec. Denies taking any ibuprofen or tylenol. Blood pressure: has improved.      Past Medical History:   Diagnosis Date    Anemia     Arthritis     Chronic back pain 2016    From all the lumbar pucntures and tubes in my back for brain surgeries    Decreased libido     Fluid retention     Headache(784.0)     Hypertension     Hypothyroid     Neurological disorder     migraine    Obesity     Sleep apnea     STATES TESTED, NO MACHINE RECOMMENDED     Past Surgical History:   Procedure Laterality Date     SECTION  1988    OTHER SURGICAL HISTORY      brain surgery cerberi pseudotumor    PARTIAL HYSTERECTOMY (CERVIX NOT REMOVED)  2010    TONSILLECTOMY       Social History     Socioeconomic History    Marital status:      Spouse name: Not on file    Number of children: Not on file    Years of education: Not on file    Highest education level: Not on file   Occupational History    Not on file   Tobacco Use    Smoking status: Some Days     Packs/day: 0.00     Years: 3.00     Additional pack years: 0.00     Total pack years: 0.00     Types: Cigarettes, E-Cigarettes     Last attempt to quit: 2/15/2016     Years since quittin.8    Smokeless tobacco: Never   Substance and Sexual Activity    Alcohol use: Not

## 2023-12-06 ENCOUNTER — TELEPHONE (OUTPATIENT)
Age: 58
End: 2023-12-06

## 2023-12-06 NOTE — TELEPHONE ENCOUNTER
Called patient in regards to a referral in the [address] [place] [address] for weight loss. Patient agreed to following instructions in the Orientation email.

## 2023-12-12 ASSESSMENT — ENCOUNTER SYMPTOMS
SHORTNESS OF BREATH: 0
CHEST TIGHTNESS: 0
COUGH: 1

## 2024-01-08 ENCOUNTER — HOSPITAL ENCOUNTER (OUTPATIENT)
Facility: HOSPITAL | Age: 59
Discharge: HOME | End: 2024-01-11
Attending: SPECIALIST
Payer: COMMERCIAL

## 2024-01-08 DIAGNOSIS — G96.00 CEREBROSPINAL FLUID LEAK: ICD-10-CM

## 2024-01-08 DIAGNOSIS — J31.0 CHRONIC RHINITIS: ICD-10-CM

## 2024-01-08 PROCEDURE — 62328 DX LMBR SPI PNXR W/FLUOR/CT: CPT

## 2024-01-08 NOTE — DISCHARGE INSTRUCTIONS
Spinal Tap (Lumbar Puncture): What to Expect at Home  Your Recovery     A spinal tap (also called a lumbar puncture) is a test to check the fluid that surrounds and protects your spinal cord and brain. Your doctor may have done this test to look for an infection. Sometimes it's done to release pressure from too much fluid or to look for diseases such as multiple sclerosis.  You may feel tired, and your back may be sore where the needle went in (the puncture site). You may have a mild headache for a day or two. This can happen when some of the spinal fluid is removed. Drinking extra fluids, taking pain medicine, and lying down for several hours after the procedure may help the headache be less severe. Some people also have trouble sleeping for a day or two.  The fluid taken during a spinal tap is often sent to a lab for tests. Your doctor or nurse will call you with the test results.  This care sheet gives you a general idea about how long it will take for you to recover. But each person recovers at a different pace. Follow the steps below to get better as quickly as possible.  How can you care for yourself at home?  Activity    Your doctor may tell you to lie flat in bed for 1 to 4 hours after the procedure.     Rest when you feel tired. Getting enough sleep will help you recover.     You can drive in 24 hours.  Wait 24 hours to shower. No tub bath or swimming for 1 week.   Diet    Drink extra fluids after the procedure to help a headache be less severe.   Medicines    If you stopped taking aspirin or some other blood thinner, your doctor will tell you when to start taking it again.     If you have pain, take pain medicines exactly as directed.  If the doctor gave you a prescription medicine for pain, take it as prescribed.  If you are not taking a prescription pain medicine, ask your doctor if you can take an over-the-counter medicine.     If you think your pain medicine is making you sick to your

## 2024-03-04 SDOH — ECONOMIC STABILITY: TRANSPORTATION INSECURITY
IN THE PAST 12 MONTHS, HAS LACK OF TRANSPORTATION KEPT YOU FROM MEETINGS, WORK, OR FROM GETTING THINGS NEEDED FOR DAILY LIVING?: YES

## 2024-03-04 SDOH — ECONOMIC STABILITY: INCOME INSECURITY: HOW HARD IS IT FOR YOU TO PAY FOR THE VERY BASICS LIKE FOOD, HOUSING, MEDICAL CARE, AND HEATING?: VERY HARD

## 2024-03-04 SDOH — ECONOMIC STABILITY: FOOD INSECURITY: WITHIN THE PAST 12 MONTHS, THE FOOD YOU BOUGHT JUST DIDN'T LAST AND YOU DIDN'T HAVE MONEY TO GET MORE.: OFTEN TRUE

## 2024-03-04 SDOH — ECONOMIC STABILITY: FOOD INSECURITY: WITHIN THE PAST 12 MONTHS, YOU WORRIED THAT YOUR FOOD WOULD RUN OUT BEFORE YOU GOT MONEY TO BUY MORE.: OFTEN TRUE

## 2024-03-04 SDOH — ECONOMIC STABILITY: HOUSING INSECURITY
IN THE LAST 12 MONTHS, WAS THERE A TIME WHEN YOU DID NOT HAVE A STEADY PLACE TO SLEEP OR SLEPT IN A SHELTER (INCLUDING NOW)?: PATIENT DECLINED

## 2024-03-05 ENCOUNTER — OFFICE VISIT (OUTPATIENT)
Facility: CLINIC | Age: 59
End: 2024-03-05
Payer: COMMERCIAL

## 2024-03-05 VITALS
SYSTOLIC BLOOD PRESSURE: 128 MMHG | DIASTOLIC BLOOD PRESSURE: 90 MMHG | TEMPERATURE: 98 F | BODY MASS INDEX: 54.58 KG/M2 | WEIGHT: 293 LBS | OXYGEN SATURATION: 100 % | HEART RATE: 97 BPM

## 2024-03-05 DIAGNOSIS — R21 BULLOUS RASH: ICD-10-CM

## 2024-03-05 DIAGNOSIS — Z13.1 DIABETES MELLITUS SCREENING: ICD-10-CM

## 2024-03-05 DIAGNOSIS — I10 PRIMARY HYPERTENSION: ICD-10-CM

## 2024-03-05 DIAGNOSIS — G47.33 OSA (OBSTRUCTIVE SLEEP APNEA): ICD-10-CM

## 2024-03-05 DIAGNOSIS — E11.65 TYPE 2 DIABETES MELLITUS WITH HYPERGLYCEMIA, WITHOUT LONG-TERM CURRENT USE OF INSULIN (HCC): Primary | ICD-10-CM

## 2024-03-05 DIAGNOSIS — M67.40 MUCOID CYST, JOINT: ICD-10-CM

## 2024-03-05 LAB — HBA1C MFR BLD: 7.1 %

## 2024-03-05 PROCEDURE — 3074F SYST BP LT 130 MM HG: CPT | Performed by: STUDENT IN AN ORGANIZED HEALTH CARE EDUCATION/TRAINING PROGRAM

## 2024-03-05 PROCEDURE — 99215 OFFICE O/P EST HI 40 MIN: CPT | Performed by: STUDENT IN AN ORGANIZED HEALTH CARE EDUCATION/TRAINING PROGRAM

## 2024-03-05 PROCEDURE — 83036 HEMOGLOBIN GLYCOSYLATED A1C: CPT | Performed by: STUDENT IN AN ORGANIZED HEALTH CARE EDUCATION/TRAINING PROGRAM

## 2024-03-05 PROCEDURE — 3080F DIAST BP >= 90 MM HG: CPT | Performed by: STUDENT IN AN ORGANIZED HEALTH CARE EDUCATION/TRAINING PROGRAM

## 2024-03-05 RX ORDER — TRIAMCINOLONE ACETONIDE 1 MG/G
OINTMENT TOPICAL 2 TIMES DAILY
Qty: 30 G | Refills: 0 | Status: SHIPPED | OUTPATIENT
Start: 2024-03-05 | End: 2024-03-12

## 2024-03-05 NOTE — PROGRESS NOTES
[facility]  Subjective       Chief Complaint   Patient presents with    Follow-up     Medications and chronic conditions      HPI:  Lisa Holden is a 58 y.o. female.    HYPERTENSION, Essential  Reports Compliance with meds  Does not have a cuff right now    INTRACRANIAL HTN:  Acetazolamide 250 mg two tablets in the morning  Had a recent LP and increased  Vision getting blurry again  Seeing specialist Dr Girard- no appointment right now but needs to schedule now that she got her LP results    RASH, bullous:  Says she only takes zyrtec as needed.  On right arm and lower back only  Started as blisters and then scratching/bursting  Non painful \"only if scratching\"  Says has not really been itchy  Denies any changes in skin products  Spots popped up at different times  Denies any new medications  Does have significant allergy history  Onset since before December  Denies history of eczema    Body mass index is 54.58 kg/m².  Has a nutritionist, reports her nutritionist hasn't gotten back with her most recently  Has not made any meaningful weight loss with nutrition / exercise regimen  She says she is interested in injectable weight loss medications  Has never tried these medications  Was referred to bariatric surgery but she would like to hold off on this for now  Current weight 328 lb    Occupation:     Past Medical History:   Diagnosis Date    Anemia     Arthritis     Chronic back pain 2016    From all the lumbar pucntures and tubes in my back for brain surgeries    Decreased libido     Fluid retention     Headache(784.0)     Hypertension     Hypothyroid     Neurological disorder     migraine    Obesity     Sleep apnea     STATES TESTED, NO MACHINE RECOMMENDED     Current Outpatient Medications   Medication Sig Dispense Refill    Semaglutide,0.25 or 0.5MG/DOS, 2 MG/3ML SOPN Inject 0.25 mg into the skin Once a week at 5 PM 3 mL 1    triamcinolone (KENALOG) 0.1 % ointment Apply

## 2024-03-05 NOTE — PROGRESS NOTES
Identified pt with two pt identifiers(name and ). Reviewed record in preparation for visit and have obtained necessary documentation.  Chief Complaint   Patient presents with    Follow-up     Medications and chronic conditions       There were no vitals filed for this visit.   Pain Scale: /10  Health Maintenance Due   Topic    Cervical cancer screen     Diabetes screen     Lipids     Colorectal Cancer Screen     Pneumococcal 0-64 years Vaccine (2 - PCV)    Breast cancer screen      Coordination of Care Questionnaire:  :   1. Have you been to the ER, urgent care clinic since your last visit?  Hospitalized since your last visit?[facility] for fluid removal   2. Have you seen or consulted any other health care providers outside of the [facility] since your last visit?  Include any pap smears or colon screening. no

## 2024-03-06 ENCOUNTER — TELEPHONE (OUTPATIENT)
Facility: CLINIC | Age: 59
End: 2024-03-06

## 2024-03-06 NOTE — TELEPHONE ENCOUNTER
Prior authorization for the ozimpic (0.25-.5mg dose) 2mg/3ml pen injectors key# [id number]  PA case ID: [id number] was denied

## 2024-03-11 RX ORDER — DULAGLUTIDE 0.75 MG/.5ML
0.75 INJECTION, SOLUTION SUBCUTANEOUS WEEKLY
Qty: 4 ADJUSTABLE DOSE PRE-FILLED PEN SYRINGE | Refills: 3 | Status: SHIPPED | OUTPATIENT
Start: 2024-03-11

## 2024-04-09 ENCOUNTER — OFFICE VISIT (OUTPATIENT)
Facility: CLINIC | Age: 59
End: 2024-04-09
Payer: COMMERCIAL

## 2024-04-09 VITALS
OXYGEN SATURATION: 97 % | DIASTOLIC BLOOD PRESSURE: 84 MMHG | HEIGHT: 65 IN | TEMPERATURE: 98.1 F | HEART RATE: 94 BPM | WEIGHT: 293 LBS | SYSTOLIC BLOOD PRESSURE: 135 MMHG | BODY MASS INDEX: 48.82 KG/M2 | RESPIRATION RATE: 16 BRPM

## 2024-04-09 DIAGNOSIS — K59.00 CONSTIPATION, UNSPECIFIED CONSTIPATION TYPE: ICD-10-CM

## 2024-04-09 DIAGNOSIS — G89.29 CHRONIC LOW BACK PAIN WITHOUT SCIATICA, UNSPECIFIED BACK PAIN LATERALITY: ICD-10-CM

## 2024-04-09 DIAGNOSIS — E03.9 ACQUIRED HYPOTHYROIDISM: ICD-10-CM

## 2024-04-09 DIAGNOSIS — E11.65 TYPE 2 DIABETES MELLITUS WITH HYPERGLYCEMIA, WITHOUT LONG-TERM CURRENT USE OF INSULIN (HCC): Primary | ICD-10-CM

## 2024-04-09 DIAGNOSIS — R09.A2 GLOBUS SENSATION: ICD-10-CM

## 2024-04-09 DIAGNOSIS — M54.50 CHRONIC LOW BACK PAIN WITHOUT SCIATICA, UNSPECIFIED BACK PAIN LATERALITY: ICD-10-CM

## 2024-04-09 DIAGNOSIS — E11.65 TYPE 2 DIABETES MELLITUS WITH HYPERGLYCEMIA, WITHOUT LONG-TERM CURRENT USE OF INSULIN (HCC): ICD-10-CM

## 2024-04-09 DIAGNOSIS — I10 PRIMARY HYPERTENSION: ICD-10-CM

## 2024-04-09 DIAGNOSIS — M67.40 MUCOID CYST, JOINT: ICD-10-CM

## 2024-04-09 DIAGNOSIS — G47.33 OSA (OBSTRUCTIVE SLEEP APNEA): ICD-10-CM

## 2024-04-09 PROCEDURE — 3075F SYST BP GE 130 - 139MM HG: CPT | Performed by: STUDENT IN AN ORGANIZED HEALTH CARE EDUCATION/TRAINING PROGRAM

## 2024-04-09 PROCEDURE — 3079F DIAST BP 80-89 MM HG: CPT | Performed by: STUDENT IN AN ORGANIZED HEALTH CARE EDUCATION/TRAINING PROGRAM

## 2024-04-09 PROCEDURE — 99215 OFFICE O/P EST HI 40 MIN: CPT | Performed by: STUDENT IN AN ORGANIZED HEALTH CARE EDUCATION/TRAINING PROGRAM

## 2024-04-09 RX ORDER — CETIRIZINE HYDROCHLORIDE 10 MG/1
10 TABLET ORAL DAILY
Qty: 90 TABLET | Refills: 1 | Status: SHIPPED | OUTPATIENT
Start: 2024-04-09

## 2024-04-09 RX ORDER — POLYETHYLENE GLYCOL 3350 17 G/17G
17 POWDER, FOR SOLUTION ORAL DAILY PRN
Qty: 510 G | Refills: 0 | Status: SHIPPED | OUTPATIENT
Start: 2024-04-09 | End: 2024-05-09

## 2024-04-09 RX ORDER — DULAGLUTIDE 1.5 MG/.5ML
1.5 INJECTION, SOLUTION SUBCUTANEOUS WEEKLY
Qty: 4 ADJUSTABLE DOSE PRE-FILLED PEN SYRINGE | Refills: 1 | Status: SHIPPED | OUTPATIENT
Start: 2024-04-09 | End: 2024-05-09

## 2024-04-09 RX ORDER — DOCUSATE SODIUM 100 MG/1
100 CAPSULE, LIQUID FILLED ORAL 2 TIMES DAILY
Qty: 60 CAPSULE | Refills: 0 | Status: SHIPPED | OUTPATIENT
Start: 2024-04-09 | End: 2024-05-09

## 2024-04-09 RX ORDER — DULAGLUTIDE 0.75 MG/.5ML
0.75 INJECTION, SOLUTION SUBCUTANEOUS WEEKLY
Qty: 4 ADJUSTABLE DOSE PRE-FILLED PEN SYRINGE | Refills: 1 | Status: CANCELLED | OUTPATIENT
Start: 2024-04-09

## 2024-04-09 ASSESSMENT — PATIENT HEALTH QUESTIONNAIRE - PHQ9
2. FEELING DOWN, DEPRESSED OR HOPELESS: SEVERAL DAYS
1. LITTLE INTEREST OR PLEASURE IN DOING THINGS: NOT AT ALL
SUM OF ALL RESPONSES TO PHQ QUESTIONS 1-9: 1
SUM OF ALL RESPONSES TO PHQ9 QUESTIONS 1 & 2: 1

## 2024-04-09 NOTE — PROGRESS NOTES
I have reviewed all needed documentation in preparation for visit. Verified patient by name and date of birth  Chief Complaint   Patient presents with    Follow-up    Back Pain    Medication Check     Follow up regarding back pain, medication and diabetes.    Diabetes     Vitals:    04/09/24 1342   BP: 135/84   Pulse: 94   Resp: 16   Temp: 98.1 °F (36.7 °C)   SpO2: 97%     Patient does not understand why she cannot get Ozempic. Her niece takes it and is on Medicaid.   Patient is discouraged that she isn't losing weight. Patient states that she does not eat so she does not understand why she isn't losing weight.   Patient would like some education for checking her blood glucose levels. Patient is also concerned regarding her bowel movements. Patient states that she was constipated for a month. She states that she goes about once a week on average.   Patient also has a form that the provider needs to sign for mileage.     Health Maintenance Due   Topic Date Due    Cervical cancer screen  Never done    Lipids  Never done    Colorectal Cancer Screen  Never done    Pneumococcal 0-64 years Vaccine (2 of 2 - PCV) 02/19/2017    Breast cancer screen  Never done       1. \"Have you been to the ER, urgent care clinic since your last visit?  Hospitalized since your last visit?\" No    2. \"Have you seen or consulted any other health care providers outside of the [facility] since your last visit?\" No     3. For patients aged 45-75: Has the patient had a colonoscopy / FIT/ Cologuard? No      If the patient is female:    4. For patients aged 40-74: Has the patient had a mammogram within the past 2 years? Yes - no Care Gap present      5. For patients aged 21-65: Has the patient had a pap smear? Yes - no Care Gap present    Carmel Verde LPN  
  Component Value Date     10/09/2023    K 4.2 10/09/2023     (H) 10/09/2023    CO2 24 10/09/2023    GLUCOSE 108 (H) 10/09/2023    BUN 13 10/09/2023    CREATININE 1.00 10/09/2023    CALCIUM 8.9 10/09/2023    ALT 20 08/30/2023    AST 10 (L) 08/30/2023    ALKPHOS 92 08/30/2023    LABALBU 3.4 (L) 08/30/2023       Assessment & Plan      1. Type 2 diabetes mellitus with hyperglycemia, without long-term current use of insulin ([facility])  -     blood glucose monitor kit and supplies; Dispense sufficient amount for indicated testing frequency plus additional to accommodate PRN testing needs. Dispense all needed supplies to include: monitor, strips, lancing device, lancets, control solutions, alcohol swabs., Disp-1 kit, R-0, Normal  -     Microalbumin / Creatinine Urine Ratio; Future  -     AFL - Elke Jose MD, Ophthalmology, [place]  -     dulaglutide (TRULICITY) 1.5 MG/0.5ML SC injection; Inject 0.5 mLs into the skin once a week, Disp-4 Adjustable Dose Pre-filled Pen Syringe, R-1Normal  2. BMI 50.0-59.9, adult ([facility])  -     dulaglutide (TRULICITY) 1.5 MG/0.5ML SC injection; Inject 0.5 mLs into the skin once a week, Disp-4 Adjustable Dose Pre-filled Pen Syringe, R-1Normal  3. ELLIE (obstructive sleep apnea)  4. Primary hypertension  5. Acquired hypothyroidism  6. Constipation, unspecified constipation type  -     docusate sodium (COLACE) 100 MG capsule; Take 1 capsule by mouth 2 times daily, Disp-60 capsule, R-0Normal  -     polyethylene glycol (GLYCOLAX) 17 GM/SCOOP powder; Take 17 g by mouth daily as needed (constipation), Disp-510 g, R-0Normal  7. Mucoid cyst, joint  -     Amb External Referral To Dermatology  8. Chronic low back pain without sciatica, unspecified back pain laterality  -     External Referral To Orthopedic Surgery    Goal of weight loss in 3 months is: 16 pounds  Current weight loss is 6 lb after 5 weeks    Last A1c was 7.1%  New onset in last couple of months  Tolerating trulicity but does

## 2024-04-11 LAB
ALBUMIN/CREAT UR: 5 MG/G CREAT (ref 0–29)
CREAT UR-MCNC: 163.1 MG/DL
MICROALBUMIN UR-MCNC: 7.6 UG/ML

## 2024-04-12 ENCOUNTER — OFFICE VISIT (OUTPATIENT)
Age: 59
End: 2024-04-12
Payer: COMMERCIAL

## 2024-04-12 VITALS
TEMPERATURE: 97.8 F | OXYGEN SATURATION: 100 % | RESPIRATION RATE: 16 BRPM | HEART RATE: 103 BPM | BODY MASS INDEX: 48.82 KG/M2 | DIASTOLIC BLOOD PRESSURE: 79 MMHG | HEIGHT: 65 IN | WEIGHT: 293 LBS | SYSTOLIC BLOOD PRESSURE: 123 MMHG

## 2024-04-12 DIAGNOSIS — R13.12 OROPHARYNGEAL DYSPHAGIA: ICD-10-CM

## 2024-04-12 DIAGNOSIS — E03.9 HYPOTHYROIDISM, UNSPECIFIED TYPE: ICD-10-CM

## 2024-04-12 DIAGNOSIS — E03.9 ACQUIRED HYPOTHYROIDISM: Primary | ICD-10-CM

## 2024-04-12 DIAGNOSIS — E66.01 MORBID OBESITY (HCC): ICD-10-CM

## 2024-04-12 PROCEDURE — 99215 OFFICE O/P EST HI 40 MIN: CPT | Performed by: INTERNAL MEDICINE

## 2024-04-12 PROCEDURE — 3074F SYST BP LT 130 MM HG: CPT | Performed by: INTERNAL MEDICINE

## 2024-04-12 PROCEDURE — 3078F DIAST BP <80 MM HG: CPT | Performed by: INTERNAL MEDICINE

## 2024-04-12 RX ORDER — LEVOTHYROXINE SODIUM 0.15 MG/1
150 TABLET ORAL DAILY
Qty: 90 TABLET | Refills: 3 | Status: SHIPPED | OUTPATIENT
Start: 2024-04-12

## 2024-04-12 NOTE — PROGRESS NOTES
[facility]ABET[facility] ENDOCRINOLOGY                 Pauline Flores MD                     Patient Information   Date:4/20/2022   Name : Lisa Holden 56 y.o.       YOB: 1965           Referred by: Dr Jai Pruett        Chief Complaint   Patient presents with    Thyroid Problem               History of Present Illness: Lisa Holden is here follow-up  Last visit was October 2023  lot of stress at home, taking care of the mother, on food stamps,   History of primary hypothyroidism on replacement levothyroxine.  Diagnosed with diabetes in 2023, on Trulicity      Found to have partial empty sella on MRI,pituitary hormonal screen normal      12/13  MRI    The enlarged sella turcica is unchanged and predominantly filled with CSF. The pituitary gland is located in the inferior periphery of the sella. No abnormal  enhancement. The infundibulum is midline and demonstrates normal signal and caliber. No mass effect on the optic chiasm. The cavernous    sinuses are within normal limits. The cavernous carotid flow-voids are    patent. Optic nerves may be slightly thickened. Subtle flattening of the optic nerve    insertions on the globe.         History of primary hypothyroidism on replacement levothyroxine.      Family hx thyroid cancer              Physical Examination:      General: pleasant, no distress, good eye contact    HEENT: no exopthalmos, no periorbital edema, no scleral/conjunctival injection, EOMI, no lid lag or stare,no sinus tenderness    Neck: supple, no thyromegaly    Cardiovascular: regular, normal rate, normal S1 and S2    Respiratory: clear to auscultation bilaterally    Musculoskeletal:   no rashes, no edema,    Neurological: Alert and oriented x3, no focal deficits    Psychiatric: normal mood and affect      Data Reviewed:             Lab Results   Component Value Date    TSH 1.71 04/20/2022    HGX4EWH 0.91 10/09/2023    T4FREE 1.2 10/09/2023       No results found for: \"TRAB\",

## 2024-04-12 NOTE — PATIENT INSTRUCTIONS
I have ordered scan/test and if you do not hear from the hospital in 3- 4 business days  please call the number [phone] to speak with the scheduling team directly.

## 2024-05-09 ENCOUNTER — HOSPITAL ENCOUNTER (OUTPATIENT)
Facility: HOSPITAL | Age: 59
Discharge: HOME | End: 2024-05-09
Payer: COMMERCIAL

## 2024-05-09 DIAGNOSIS — R13.12 OROPHARYNGEAL DYSPHAGIA: ICD-10-CM

## 2024-05-09 PROCEDURE — 76536 US EXAM OF HEAD AND NECK: CPT

## 2024-07-05 DIAGNOSIS — E11.65 TYPE 2 DIABETES MELLITUS WITH HYPERGLYCEMIA, WITHOUT LONG-TERM CURRENT USE OF INSULIN (HCC): ICD-10-CM

## 2024-07-05 RX ORDER — DULAGLUTIDE 1.5 MG/.5ML
1.5 INJECTION, SOLUTION SUBCUTANEOUS WEEKLY
Qty: 4 ADJUSTABLE DOSE PRE-FILLED PEN SYRINGE | Refills: 1 | Status: SHIPPED | OUTPATIENT
Start: 2024-07-05 | End: 2024-08-04

## 2024-07-05 NOTE — TELEPHONE ENCOUNTER
Patient called requesting refill on the following:    Trulicity 1.5 mg    [facility]    Patient is due for injection on Sunday and is out.  States [facility] told her its been requested twice in the last week. Nothing in chart

## 2024-07-05 NOTE — TELEPHONE ENCOUNTER
Requested Prescriptions     Pending Prescriptions Disp Refills    dulaglutide (TRULICITY) 1.5 MG/0.5ML SC injection 4 Adjustable Dose Pre-filled Pen Syringe 1     Sig: Inject 0.5 mLs into the skin once a week

## 2024-07-08 PROBLEM — R22.0 FACIAL SWELLING: Status: ACTIVE | Noted: 2023-08-30

## 2024-07-24 ENCOUNTER — OFFICE VISIT (OUTPATIENT)
Facility: CLINIC | Age: 59
End: 2024-07-24
Payer: COMMERCIAL

## 2024-07-24 VITALS
RESPIRATION RATE: 18 BRPM | WEIGHT: 293 LBS | SYSTOLIC BLOOD PRESSURE: 134 MMHG | BODY MASS INDEX: 48.82 KG/M2 | DIASTOLIC BLOOD PRESSURE: 73 MMHG | HEIGHT: 65 IN | HEART RATE: 99 BPM | OXYGEN SATURATION: 100 % | TEMPERATURE: 97.5 F

## 2024-07-24 DIAGNOSIS — K59.00 CONSTIPATION, UNSPECIFIED CONSTIPATION TYPE: ICD-10-CM

## 2024-07-24 DIAGNOSIS — I10 PRIMARY HYPERTENSION: ICD-10-CM

## 2024-07-24 DIAGNOSIS — E03.9 ACQUIRED HYPOTHYROIDISM: ICD-10-CM

## 2024-07-24 DIAGNOSIS — E11.65 TYPE 2 DIABETES MELLITUS WITH HYPERGLYCEMIA, WITHOUT LONG-TERM CURRENT USE OF INSULIN (HCC): Primary | ICD-10-CM

## 2024-07-24 LAB — HBA1C MFR BLD: 6 %

## 2024-07-24 PROCEDURE — 3075F SYST BP GE 130 - 139MM HG: CPT | Performed by: STUDENT IN AN ORGANIZED HEALTH CARE EDUCATION/TRAINING PROGRAM

## 2024-07-24 PROCEDURE — 99214 OFFICE O/P EST MOD 30 MIN: CPT | Performed by: STUDENT IN AN ORGANIZED HEALTH CARE EDUCATION/TRAINING PROGRAM

## 2024-07-24 PROCEDURE — 83036 HEMOGLOBIN GLYCOSYLATED A1C: CPT | Performed by: STUDENT IN AN ORGANIZED HEALTH CARE EDUCATION/TRAINING PROGRAM

## 2024-07-24 PROCEDURE — 3078F DIAST BP <80 MM HG: CPT | Performed by: STUDENT IN AN ORGANIZED HEALTH CARE EDUCATION/TRAINING PROGRAM

## 2024-07-24 RX ORDER — DULAGLUTIDE 1.5 MG/.5ML
1.5 INJECTION, SOLUTION SUBCUTANEOUS WEEKLY
Qty: 4 ADJUSTABLE DOSE PRE-FILLED PEN SYRINGE | Refills: 3 | Status: SHIPPED | OUTPATIENT
Start: 2024-07-24 | End: 2024-08-23

## 2024-07-24 NOTE — PROGRESS NOTES
[facility]  Subjective       Chief Complaint   Patient presents with    Follow-up    Diabetes     HPI:  Lisa Holden is a 58 y.o. female here for diabetes follow up and wants DMV placard filled out, constipation    Initially states \"I came here to get the shingles vaccine\" \"what do you even do for me here\"    INTRACRANIAL HTN  Says Dr Girard said her fluid is up so has to see neurologist to possible stent replacement  Says she has the MRI this week     Acetazolamide 250 mg two tablets in the morning  Says her insurance wouldn't cover the 500 mg    Says she owes a bill at [employer] so she can't get more labs done    TYPE 2 DIABETES MELLITUS  Non-insulin dependent   Diagnosed: 2024  Reports Compliance with Medication  Trulicity 1.5 mg weekly  Says she is doing well with medication  Says on Monday she feels tired after injecting herself but \"was like this before\"  Says she will get \"sent to the bathroom\" in the morning if she eats something wrong    Last Dilated Eye Exam: seen [facility], says she found an alternative  Foot Exam: has not done    No way to check sugars at home  Peripheral Neuropathy no    Hemoglobin A1C, POC   Date Value Ref Range Status   07/24/2024 6.0 % Final      CONSTIPATION  Says she tried miralax and not helping  Says she tried the miralax in orange juice and this helped   Says she tried the prune juice    Body mass index is 53.08 kg/m².  Has had some nausea with taking the trulicity.  Has lost 6 pounds since 5 weeks ago  Weight today 319 lb   Weight last 322 lb  Weight before 328 lb    LOW BACK PAIN  Chronic    HYPERTENSION, Essential  Controlled here  Does not have a cuff right now    HM:  She denies PAP, mammogram or colonoscopy    Past Medical History:   Diagnosis Date    Anemia     Arthritis     Chronic back pain 2016    From all the lumbar pucntures and tubes in my back for brain surgeries    Decreased libido     Diabetes (HCC)     Fluid retention     Headache(784.0)

## 2024-07-24 NOTE — PROGRESS NOTES
\"Have you been to the ER, urgent care clinic since your last visit?  Hospitalized since your last visit?\"    NO    “Have you seen or consulted any other health care providers outside of [facility] since your last visit?”    NO    Have you had a mammogram?”   NO     “Have you had a pap smear?”    NO        “Have you had a colorectal cancer screening such as a colonoscopy/FIT/Cologuard?    NO      Chief Complaint   Patient presents with    Follow-up    Diabetes     /73 (Site: Right Upper Arm, Position: Sitting, Cuff Size: Large Adult)   Pulse 99   Temp 97.5 °F (36.4 °C) (Temporal)   Resp 18   Ht 1.651 m (5' 5\")   Wt (!) 144.7 kg (319 lb)   SpO2 100%   BMI 53.08 kg/m²

## 2024-07-26 ENCOUNTER — HOSPITAL ENCOUNTER (OUTPATIENT)
Facility: HOSPITAL | Age: 59
End: 2024-07-26
Payer: COMMERCIAL

## 2024-07-26 DIAGNOSIS — J31.0 CHRONIC RHINITIS: ICD-10-CM

## 2024-07-26 PROCEDURE — A9577 INJ MULTIHANCE: HCPCS | Performed by: SPECIALIST

## 2024-07-26 PROCEDURE — 6360000004 HC RX CONTRAST MEDICATION: Performed by: SPECIALIST

## 2024-07-26 PROCEDURE — 70553 MRI BRAIN STEM W/O & W/DYE: CPT

## 2024-07-26 RX ADMIN — GADOBENATE DIMEGLUMINE 20 ML: 529 INJECTION, SOLUTION INTRAVENOUS at 12:49

## 2024-08-09 ENCOUNTER — TELEPHONE (OUTPATIENT)
Age: 59
End: 2024-08-09

## 2024-08-09 NOTE — TELEPHONE ENCOUNTER
I spoke with the patient to let her know that we will need her to see Neuro Op[facility] prior to being scheduled here. Patient states that she was unable to get an appointment until 12/2024. I gave the patient the phone numbers of [facility] and [facility] includes the [facility] to see if she could get a sooner appointment. Patient to call back with date.

## 2024-08-28 ENCOUNTER — OFFICE VISIT (OUTPATIENT)
Age: 59
End: 2024-08-28
Payer: COMMERCIAL

## 2024-08-28 DIAGNOSIS — E11.65 TYPE 2 DIABETES MELLITUS WITH HYPERGLYCEMIA, WITHOUT LONG-TERM CURRENT USE OF INSULIN (HCC): Primary | ICD-10-CM

## 2024-08-28 PROCEDURE — G0108 DIAB MANAGE TRN  PER INDIV: HCPCS | Performed by: DIETITIAN, REGISTERED

## 2024-08-28 NOTE — PROGRESS NOTES
signs and symptoms of hypoglycemia that you experience: Pt reported being unaware of s/s of hypoglycemia    How do you prevent hypoglycemia: patient is unaware of how to treat low blood sugars    How do you treat hypoglycemia: Patient is unaware of how to treat hypoglycemia    Hyperglycemia Management:  What are signs and symptoms of hyperglycemia that you experience: Pt reported being unaware of s/s of hyperglycemia    How can you prevent hyperglycemia: patient is unaware of how to prevent high blood sugars    Sick Day Management:  What do you do differently on sick days:  Pt reported being unaware of self-management on sick days    Pattern Management:  Do you notice blood glucose patterns when you look at the readings in your meter or logbook? No    How do you use the blood glucose readings from your meter or logbook?  Not monitoring     Would benefit from DSMES related to Problem Solving: Yes      Articulates appropriate strategies to address hypoglycemia, hyperglycemia, sick day care and BG pattern: No      Stage of change: Contemplation      Note: Content derived from the American Association of Diabetes Educators' Diabetes Education Curriculum: A Guide to Successful Self-Management (3rd edition)      Sobia Preciado RD on 8/28/2024 at 9:52 AM    I have personally reviewed the health record, including provider notes, laboratory data and current medications before making these care and education recommendations. The time spent in this effort is included in the total time.  Total minutes: 30     Diabetes Skills, Confidence & Preparedness Index (SCPI) ©    Thank you for completing the Skills, Confidence & Preparedness Index!  Below are your scores.  All scales and questions are out of 7.  If you would like these results emailed, please enter your email address along with some identifying patient information.  Email:  Patient Identifier:      Overall SCPI score: 3.4 Skills Score: 2.2  Low: Blood Sugar  Monitoring(Q3),Blood Sugar Monitoring(Q4),Reducing Risks(Q5) Confidence Score: 2.9  Low: Healthy Eating(Q1),Problem Solving(Q7) Preparedness Score: 5.7  Low: Blood Sugar Monitoring(Q6)   Healthy Eating Score: 3.8  Low: Confidence(Q1) Taking Medication Score: 5.0  Low: Skills(Q2) Blood Sugar Monitoring Score: 2.2  Low: Skills(Q3),Skills(Q4) Reducing Risks Score: 3.0  Low: Skills(Q5)   Problem Solving Score: 3.7  Low: Skills(Q6),Confidence(Q7) Healthy Coping Score: 4.0  Low: Skills(Q7) Being Active Score: 4.0  Low: Confidence(Q5)     Skills/Knowledge Questions   1. I know how to plan meals that have the best balance between carbohydrates, proteins and vegetables. 4   2. I know how my diabetes medications (pills, injectables and/or insulin) work in my body. 5   3. I know when to check my blood sugar if I want to see how my body responded to a meal. 1   4. I know when to check my blood sugars to determine if my medication or insulin doses are correct. 1   5. I know what to do to prevent a low blood sugar when I exercise (either before, during, or after). 1   6. When I am sick, I know what to do differently with my diabetes management. 2   7. I know how stress can affect my diabetes management. 2   8. When I look at my blood sugars over a given week, I can explain what my blood sugar pattern is. 2   9. I know what my target levels are for A1c, blood pressure and cholesterol. 2   Confidence Questions   1. I am confident that I can plan balanced meals and snacks. 2   2. I am confident that I can manage my stress. 4   3. I am confident that I can prevent a low blood sugar during or after exercise. 3   4. I am confident that the next time I eat out, I will be able to choose foods that best keep my blood sugars in target. 3   5. I am confident I can include exercise into my schedule. 3   6. I am confident that I can use my daily blood sugars to adjust my diet, my activity, and/or my insulin. 3   7. When something out of my

## 2024-09-09 RX ORDER — CICLOPIROX OLAMINE 7.7 MG/G
CREAM TOPICAL
Qty: 30 G | Refills: 2 | Status: SHIPPED | OUTPATIENT
Start: 2024-09-09

## 2024-09-11 ENCOUNTER — HOSPITAL ENCOUNTER (OUTPATIENT)
Facility: HOSPITAL | Age: 59
Discharge: HOME | End: 2024-09-14
Payer: COMMERCIAL

## 2024-09-11 ENCOUNTER — TRANSCRIBE ORDERS (OUTPATIENT)
Facility: HOSPITAL | Age: 59
End: 2024-09-11

## 2024-09-11 DIAGNOSIS — Z02.71 ENCOUNTER FOR DISABILITY EXAMINATION: ICD-10-CM

## 2024-09-11 DIAGNOSIS — Z02.71 ENCOUNTER FOR DISABILITY EXAMINATION: Primary | ICD-10-CM

## 2024-09-11 PROCEDURE — 72100 X-RAY EXAM L-S SPINE 2/3 VWS: CPT

## 2024-09-19 ENCOUNTER — OFFICE VISIT (OUTPATIENT)
Age: 59
End: 2024-09-19
Payer: COMMERCIAL

## 2024-09-19 ENCOUNTER — ANCILLARY ORDERS (OUTPATIENT)
Age: 59
End: 2024-09-19

## 2024-09-19 VITALS
WEIGHT: 293 LBS | HEART RATE: 89 BPM | HEIGHT: 65 IN | RESPIRATION RATE: 18 BRPM | OXYGEN SATURATION: 99 % | DIASTOLIC BLOOD PRESSURE: 82 MMHG | BODY MASS INDEX: 48.82 KG/M2 | TEMPERATURE: 98.7 F | SYSTOLIC BLOOD PRESSURE: 118 MMHG

## 2024-09-19 DIAGNOSIS — G93.2 IIH (IDIOPATHIC INTRACRANIAL HYPERTENSION): Primary | ICD-10-CM

## 2024-09-19 PROCEDURE — 3074F SYST BP LT 130 MM HG: CPT | Performed by: RADIOLOGY

## 2024-09-19 PROCEDURE — 99205 OFFICE O/P NEW HI 60 MIN: CPT | Performed by: RADIOLOGY

## 2024-09-19 PROCEDURE — 3079F DIAST BP 80-89 MM HG: CPT | Performed by: RADIOLOGY

## 2024-09-19 RX ORDER — CYCLOBENZAPRINE HCL 5 MG
5 TABLET ORAL NIGHTLY PRN
COMMUNITY
Start: 2024-09-10 | End: 2024-10-10

## 2024-09-19 ASSESSMENT — PATIENT HEALTH QUESTIONNAIRE - PHQ9
SUM OF ALL RESPONSES TO PHQ QUESTIONS 1-9: 1
SUM OF ALL RESPONSES TO PHQ QUESTIONS 1-9: 1
1. LITTLE INTEREST OR PLEASURE IN DOING THINGS: NOT AT ALL
2. FEELING DOWN, DEPRESSED OR HOPELESS: SEVERAL DAYS
SUM OF ALL RESPONSES TO PHQ QUESTIONS 1-9: 1
SUM OF ALL RESPONSES TO PHQ9 QUESTIONS 1 & 2: 1
SUM OF ALL RESPONSES TO PHQ QUESTIONS 1-9: 1

## 2024-09-23 ENCOUNTER — CLINICAL DOCUMENTATION (OUTPATIENT)
Age: 59
End: 2024-09-23

## 2024-09-24 ENCOUNTER — HOSPITAL ENCOUNTER (OUTPATIENT)
Facility: HOSPITAL | Age: 59
Discharge: HOME | End: 2024-09-27
Attending: RADIOLOGY
Payer: COMMERCIAL

## 2024-09-24 ENCOUNTER — HOSPITAL ENCOUNTER (OUTPATIENT)
Facility: HOSPITAL | Age: 59
Discharge: HOME | End: 2024-09-27
Payer: COMMERCIAL

## 2024-09-24 VITALS — WEIGHT: 293 LBS | BODY MASS INDEX: 53.25 KG/M2

## 2024-09-24 DIAGNOSIS — G89.29 CHRONIC MIDLINE LOW BACK PAIN, UNSPECIFIED WHETHER SCIATICA PRESENT: ICD-10-CM

## 2024-09-24 DIAGNOSIS — M54.50 CHRONIC MIDLINE LOW BACK PAIN, UNSPECIFIED WHETHER SCIATICA PRESENT: ICD-10-CM

## 2024-09-24 DIAGNOSIS — G93.2 IIH (IDIOPATHIC INTRACRANIAL HYPERTENSION): ICD-10-CM

## 2024-09-24 PROCEDURE — A9579 GAD-BASE MR CONTRAST NOS,1ML: HCPCS | Performed by: RADIOLOGY

## 2024-09-24 PROCEDURE — 70553 MRI BRAIN STEM W/O & W/DYE: CPT

## 2024-09-24 PROCEDURE — 72148 MRI LUMBAR SPINE W/O DYE: CPT

## 2024-09-24 PROCEDURE — 6360000004 HC RX CONTRAST MEDICATION: Performed by: RADIOLOGY

## 2024-09-24 PROCEDURE — 70544 MR ANGIOGRAPHY HEAD W/O DYE: CPT

## 2024-09-24 RX ADMIN — GADOTERIDOL 20 ML: 279.3 INJECTION, SOLUTION INTRAVENOUS at 15:22

## 2024-10-04 ENCOUNTER — LAB (OUTPATIENT)
Age: 59
End: 2024-10-04

## 2024-10-04 ENCOUNTER — NURSE ONLY (OUTPATIENT)
Age: 59
End: 2024-10-04
Payer: COMMERCIAL

## 2024-10-04 DIAGNOSIS — R13.12 OROPHARYNGEAL DYSPHAGIA: ICD-10-CM

## 2024-10-04 DIAGNOSIS — E03.9 ACQUIRED HYPOTHYROIDISM: ICD-10-CM

## 2024-10-04 DIAGNOSIS — E11.65 TYPE 2 DIABETES MELLITUS WITH HYPERGLYCEMIA, WITHOUT LONG-TERM CURRENT USE OF INSULIN (HCC): Primary | ICD-10-CM

## 2024-10-04 PROCEDURE — G0109 DIAB MANAGE TRN IND/GROUP: HCPCS

## 2024-10-04 NOTE — PROGRESS NOTES
[facility]  Diabetes Self-Management Education & Support Program  Encounter Note      SUMMARY  Diabetes self-care management training was completed related to reducing risks.The participant will return on October 11 to continue DSMES related to healthy eating and monitoring. The participant did identify SMART Goal(s) and will practice knowledge and skills related to reducing risks to improve diabetes self-management.        EVALUATION:  Participant shared that she has IIH and has had 2 brain surgeries.  She states that she doesn't believe that family history is a risk factor.  She questions why this is one more thing she has to deal with.  She did participate in group discussions.  She shared that she is up to date on annual eye exam and foot exam.  She needs dental but is afraid to go due to past trauma.  She needs updated recommended annual vaccines.    RECOMMENDATIONS:  Personal care record  Update  vaccines/exams     TOPICS DISCUSSED TODAY:  WHAT IS DIABETES? Minutes: 60  HOW DO I STAY HEALTHY? 60      Next provider visit is scheduled for 10/11/24       SMART GOAL(S)   Find 1 stress relief technique and practice over the next 7  days  ACHIEVEMENT OF GOAL(S) : 0-24%         DATE DSMES TOPIC EVALUATION     10/4/2024 WHAT IS DIABETES?   Role of the normal pancreas in energy balance and blood glucose control   The defect seen in diabetes   Signs & symptoms of diabetes   Diagnosis of diabetes   Types of diabetes   Blood glucose targets in non-pregnant & non-pregnant adults       The participant knows  Their type of diabetes: Yes   The basic physiologic defect: Yes  Blood glucose targets: Yes     DATE DSMES TOPIC EVALUATION     10/4/2024 HOW DO I STAY HEALTHY?   Prevention   Vaccinations   Preconception care (if applicable)  Examinations   Eye    Foot   Diabetic complications' prevention   Dental health   Heart health   Kidney Health   Nerve health   Sleep health      The participant has a

## 2024-10-05 LAB
T4 FREE SERPL-MCNC: 1.2 NG/DL (ref 0.8–1.5)
TSH SERPL DL<=0.05 MIU/L-ACNC: 1.25 UIU/ML (ref 0.36–3.74)

## 2024-10-11 ENCOUNTER — OFFICE VISIT (OUTPATIENT)
Age: 59
End: 2024-10-11
Payer: COMMERCIAL

## 2024-10-11 ENCOUNTER — NURSE ONLY (OUTPATIENT)
Age: 59
End: 2024-10-11

## 2024-10-11 VITALS
BODY MASS INDEX: 48.82 KG/M2 | HEART RATE: 114 BPM | DIASTOLIC BLOOD PRESSURE: 77 MMHG | TEMPERATURE: 98 F | HEIGHT: 65 IN | WEIGHT: 293 LBS | OXYGEN SATURATION: 96 % | SYSTOLIC BLOOD PRESSURE: 139 MMHG | RESPIRATION RATE: 18 BRPM

## 2024-10-11 DIAGNOSIS — R63.5 WEIGHT GAIN: Primary | ICD-10-CM

## 2024-10-11 DIAGNOSIS — E03.9 ACQUIRED HYPOTHYROIDISM: ICD-10-CM

## 2024-10-11 DIAGNOSIS — E11.65 TYPE 2 DIABETES MELLITUS WITH HYPERGLYCEMIA, WITHOUT LONG-TERM CURRENT USE OF INSULIN (HCC): Primary | ICD-10-CM

## 2024-10-11 PROCEDURE — 3075F SYST BP GE 130 - 139MM HG: CPT | Performed by: INTERNAL MEDICINE

## 2024-10-11 PROCEDURE — 99214 OFFICE O/P EST MOD 30 MIN: CPT | Performed by: INTERNAL MEDICINE

## 2024-10-11 PROCEDURE — 3078F DIAST BP <80 MM HG: CPT | Performed by: INTERNAL MEDICINE

## 2024-10-11 RX ORDER — DEXAMETHASONE 1 MG
TABLET ORAL
Qty: 1 TABLET | Refills: 0 | Status: SHIPPED | OUTPATIENT
Start: 2024-10-11

## 2024-10-11 NOTE — PROGRESS NOTES
[facility] DIABETES AND ENDOCRINOLOGY                 Pauline Folres MD                 Referred by: Dr Jai Pruett        Chief Complaint   Patient presents with    Thyroid Problem               History of Present Illness: Lisa Holden is here follow-up    She still has lot of stress at home, taking care of the mother, on food stamps,   History of primary hypothyroidism on replacement levothyroxine.  Diagnosed with diabetes in 2023, on Trulicity  Did not complete low-dose dexamethasone suppression test        Found to have partial empty sella on MRI,pituitary hormonal screen normal      12/13  MRI    The enlarged sella turcica is unchanged and predominantly filled with CSF. The pituitary gland is located in the inferior periphery of the sella. No abnormal  enhancement. The infundibulum is midline and demonstrates normal signal and caliber. No mass effect on the optic chiasm. The cavernous    sinuses are within normal limits. The cavernous carotid flow-voids are    patent. Optic nerves may be slightly thickened. Subtle flattening of the optic nerve    insertions on the globe.         History of primary hypothyroidism on replacement levothyroxine.      Family hx thyroid cancer              Physical Examination:      General: pleasant, no distress, good eye contact    HEENT: no exopthalmos, no periorbital edema, no scleral/conjunctival injection, EOMI, no lid lag or stare,no sinus tenderness    Neck: supple, no thyromegaly    Cardiovascular: regular, normal rate, normal S1 and S2    Respiratory: clear to auscultation bilaterally    Musculoskeletal:   no rashes, no edema,    Neurological: Alert and oriented x3, no focal deficits    Psychiatric: normal mood and affect      Data Reviewed:             Lab Results   Component Value Date    TSH 1.25 10/04/2024    T4FREE 1.2 10/04/2024       No results found for: \"TRAB\", \"TSI\", \"TSILT\", \"TPO\"              Assessment/Plan:        Partial empty sella    Pituitary

## 2024-10-11 NOTE — PROGRESS NOTES
[facility]  Diabetes Self-Management Education & Support Program  Encounter Note      SUMMARY  Diabetes self-care management training was completed related to healthy eating and monitoring. The participant will return on October 18 to continue DSMES related to taking medications and physical activity. The participant did identify SMART Goal(s) and will practice knowledge and skills related to reducing risks and healthy eating and monitoring to improve diabetes self-management.        EVALUATION:  Ms. Holden asked many questions re: myths associated with foods that PWD cannot eat. She had difficulty accepting total CHO counting for meals and not consuming CHO snacks (eg fruit) between meals. She will need reinforcement of Healthy Plate Template for designing balanced meals. She does not have a glucometer for fingersticks. She reports that the pharmacy is still waiting for an approval (?). In basket message has been sent to Dr. Gavin informing her of Ms. Holden need for additional assistance c acquiring Blood glucose monitoring kit/supplies.     RECOMMENDATIONS:  Obtain Blood Glucose monitoring kit  Eat 3 meals per day; space 4-5 hours apart  Keep between meal intakes to protein, fats and/or non-strachy vegetables  Assess BG 2H pp; goal is less than 180 mg/dL     TOPICS DISCUSSED TODAY:  WHAT CAN I EAT? 60  HOW CAN BLOOD GLUCOSE MONITORING HELP ME? 60      Next provider visit is scheduled for 10/11/2024 c Dr.        SMART GOAL(S)  Create a personal care record to keep track of my self-care routine.  ACHIEVEMENT OF GOAL(S) : 0-24%    2.  Find 1 stress relief technique and practice over the next 7  days     (Crafting)  ACHIEVEMENT OF GOAL(S) :  50-74%       DATE DSMES TOPIC EVALUATION     10/11/2024 WHAT CAN I EAT?   General principles   Determining a healthy weight   Nutritional terms & tools   Healthy Plate method   Carbohydrate Counting   Reading food labels   Free apps   Pregnancy

## 2024-10-11 NOTE — PROGRESS NOTES
Lisa Holden is a 58 y.o. female here for   Chief Complaint   Patient presents with    Thyroid Problem       1. Have you been to the ER, urgent care clinic since your last visit?  Hospitalized since your last visit? -no    2. Have you seen or consulted any other health care providers outside of the [facility] since your last visit?  Include any pap smears or colon screening.-[facility]

## 2024-10-14 ENCOUNTER — TELEMEDICINE (OUTPATIENT)
Age: 59
End: 2024-10-14
Payer: COMMERCIAL

## 2024-10-14 DIAGNOSIS — G93.2 IIH (IDIOPATHIC INTRACRANIAL HYPERTENSION): Primary | ICD-10-CM

## 2024-10-14 PROCEDURE — 99213 OFFICE O/P EST LOW 20 MIN: CPT | Performed by: NURSE PRACTITIONER

## 2024-10-14 NOTE — PROGRESS NOTES
Phone call to patient in preparation for Virtual/phone visit with provider.  Name and  verified.  Patient unable to obtain vital signs at home.  Follow up for IIH and imaging results.  Patient reports continued pain at the top of her head radiating to her neck and blurred vision.  Advised patient to contact Ophthalmologist and discuss vision issues.  Patient stated she was denied disability.  
Smokeless tobacco: Never    Tobacco comments:     actional smoker   Substance Use Topics    Alcohol use: Not Currently     Comment: once a year       Review of Systems  : Pertinent ROS included in HPI    Objective:   Vital Signs: (As obtained by patient/caregiver at home)  There were no vitals taken for this visit.       Constitutional: [x] Appears well-developed and well-nourished [x] No apparent distress      [] Abnormal -     Mental status: [x] Alert and awake  [x] Oriented to person/place/time [x] Able to follow commands    [] Abnormal -     Eyes:   EOM    [x]  Normal    [] Abnormal -   Sclera  [x]  Normal    [] Abnormal -          Discharge [x]  None visible   [] Abnormal -     HENT: [x] Normocephalic, atraumatic  [] Abnormal -   [x] Mouth/Throat: Mucous membranes are moist    External Ears [x] Normal  [] Abnormal -    Neck: [x] No visualized mass [] Abnormal -     Pulmonary/Chest: [x] Respiratory effort normal   [x] No visualized signs of difficulty breathing or respiratory distress        [] Abnormal -      Musculoskeletal:   [x] Normal gait with no signs of ataxia         [x] Normal range of motion of neck        [] Abnormal -     Neurological:        [x] No Facial Asymmetry (Cranial nerve 7 motor function) (limited exam due to video visit)          [x] No gaze palsy        [] Abnormal -          Skin:        [x] No significant exanthematous lesions or discoloration noted on facial skin         [] Abnormal -            Psychiatric:       [x] Normal Affect [] Abnormal -        [x] No Hallucinations    Other pertinent observable physical exam findings:-    Neurologic Exam:  Mental Status:  Alert and oriented x 4.  Appropriate affect, mood and behavior.       Language:    Normal fluency, repetition, comprehension and naming.    Cranial Nerves:   Cannot assess pupillary reaction.     Visual fields appear to be full to confrontation, but difficult to evaluate virtually.     Extraocular movements intact.

## 2024-10-14 NOTE — PATIENT INSTRUCTIONS
- Referral to [facility] placed.   - Follow up with Dr Gomez for further IIH management.

## 2024-10-18 ENCOUNTER — NURSE ONLY (OUTPATIENT)
Age: 59
End: 2024-10-18
Payer: COMMERCIAL

## 2024-10-18 ENCOUNTER — TELEPHONE (OUTPATIENT)
Age: 59
End: 2024-10-18

## 2024-10-18 DIAGNOSIS — E11.65 TYPE 2 DIABETES MELLITUS WITH HYPERGLYCEMIA, WITHOUT LONG-TERM CURRENT USE OF INSULIN (HCC): Primary | ICD-10-CM

## 2024-10-18 PROCEDURE — G0109 DIAB MANAGE TRN IND/GROUP: HCPCS

## 2024-10-21 NOTE — TELEPHONE ENCOUNTER
Requested Prescriptions     Pending Prescriptions Disp Refills    cetirizine (ZYRTEC) 10 MG tablet 90 tablet 1     Sig: Take 1 tablet by mouth daily     Last ov:07/24/24  Next ov:10/25/2024  Last rx:04/09/2024  Last lab:10/11/2024

## 2024-10-21 NOTE — TELEPHONE ENCOUNTER
Method of communication:  [x]Fax []Phone call []In person   []Other:    Who is making request:[facility] [address] [facility]  What medication/s (include strength and dosing):  Cetirizine 10mg tab   Qty 90  Take 1 tablet by mouth once daily    This is for a:   [x]Refill    []New medication request  []Follow up on prior request    Ph[facility]acy[facility]  Best contact for patient:[phone]    Additional notes:

## 2024-10-21 NOTE — PROGRESS NOTES
[facility]  Diabetes Self-Management Education & Support Program  Encounter Note      SUMMARY  Diabetes self-care management training was completed related to monitoring, taking medications, and physical activity. The participant will return on October 25 to continue DSMES related to healthy coping and problem solving. The participant did identify SMART Goal(s) and will practice knowledge and skills related to reducing risks, healthy eating and monitoring, and being active and medications to improve diabetes self-management.        EVALUATION:  Participant wa engaged in learning.  She shared that she takes Trulicity and is seeing good results.  She states that she is being referred to weight management by her neurologist.  She is taking her medications as prescribed for full benefit.  She was hesitant to participate in group exercise, but did some movement during class.  She stayed after class to obtain additional information and completed personal care record.    RECOMMENDATIONS:  Continue physical activity as tolerated     TOPICS DISCUSSED TODAY:  HOW CAN BLOOD GLUCOSE MONITORING HELP ME? 30  HOW DO MY DIABETES MEDICATIONS WORK? 30  HOW DOES PHYSICAL ACTIVITY AFFECT MY DIABETES? 60      Next provider visit is scheduled for 10/25/24       SMART GOAL(S)  Increase physical activity by doing chair exercises  for 15 minutes 2 times over the next week.  ACHIEVEMENT OF GOAL(S) : 0-24%    2.   Create a personal care record to keep track of my self-care routine.   ACHIEVEMENT OF GOAL(S) :  %    3.    Find 1 stress relief technique and practice over the next 7  days     (Crafting)   ACHIEVEMENT OF GOAL(S) :  %         DATE DSMES TOPIC EVALUATION     10/21/2024 HOW CAN BLOOD GLUCOSE MONITORING HELP ME?   Value of blood glucose monitoring   Realistic expectations   Blood glucose monitoring targets   Target adjustments   Setting a1c & blood glucose targets with provider   Meter

## 2024-10-22 RX ORDER — CETIRIZINE HYDROCHLORIDE 10 MG/1
10 TABLET ORAL DAILY
Qty: 90 TABLET | Refills: 1 | Status: SHIPPED | OUTPATIENT
Start: 2024-10-22

## 2024-10-25 ENCOUNTER — NURSE ONLY (OUTPATIENT)
Age: 59
End: 2024-10-25
Payer: COMMERCIAL

## 2024-10-25 DIAGNOSIS — E11.65 TYPE 2 DIABETES MELLITUS WITH HYPERGLYCEMIA, WITHOUT LONG-TERM CURRENT USE OF INSULIN (HCC): Primary | ICD-10-CM

## 2024-10-25 PROCEDURE — G0109 DIAB MANAGE TRN IND/GROUP: HCPCS | Performed by: DIETITIAN, REGISTERED

## 2024-10-31 DIAGNOSIS — R63.5 WEIGHT GAIN: Primary | ICD-10-CM

## 2024-11-01 ENCOUNTER — LAB (OUTPATIENT)
Age: 59
End: 2024-11-01

## 2024-11-01 DIAGNOSIS — R63.5 WEIGHT GAIN: ICD-10-CM

## 2024-11-05 DIAGNOSIS — E11.65 TYPE 2 DIABETES MELLITUS WITH HYPERGLYCEMIA, WITHOUT LONG-TERM CURRENT USE OF INSULIN (HCC): ICD-10-CM

## 2024-11-05 RX ORDER — DULAGLUTIDE 1.5 MG/.5ML
INJECTION, SOLUTION SUBCUTANEOUS
Qty: 4 ML | Refills: 0 | Status: SHIPPED | OUTPATIENT
Start: 2024-11-05

## 2024-11-05 NOTE — TELEPHONE ENCOUNTER
Requested Prescriptions     Pending Prescriptions Disp Refills    TRULICITY 1.5 MG/0.5ML SC injection [Pharmacy Med Name: Trulicity 1.5 MG/0.5ML Subcutaneous Solution Pen-injector] 4 mL 0     Sig: INJECT 1/2 (ONE-HALF) ML SUBCUTANEOUSLY  ONCE A WEEK              Date of last OV:7/24/24  Future OV visit scheduled:  [x] Yes -> Date: 1/24/25  [] No    Last Refill: [] N/A  Date:7/24/24  Qty:1.5 ml  # of refills:3    Med pending for provider review:  [x] Yes  [] No (provide reason why):     Requested Pharmacy updated in ENC:  [x] Yes    Applicable labs (provide date of completion):  [x] Diabetic med- A1c:7/24/24  [] Cholesterol med- Lipids:  [] BP med- CMP or BMP:   [] Thyroid med- TSH:  [] Gout med- Uric acid:  [] Prostate med- PSA:  [] Other (provide what type of med and lab):    Additional notes:

## 2024-11-06 LAB — CORTIS AM PEAK SERPL-MCNC: 2.2 UG/DL (ref 6.2–19.4)

## 2024-11-16 LAB — DEXAMETHASONE SERPL-MCNC: 202 NG/DL

## 2024-11-18 ENCOUNTER — TELEPHONE (OUTPATIENT)
Age: 59
End: 2024-11-18

## 2024-11-18 NOTE — TELEPHONE ENCOUNTER
----- Message from Dr. Pauline Flores MD sent at 11/18/2024 12:39 PM EST -----  Cortisol mildly abnormal, stress, depression, sleep apnea (if you have any of the above)  can all elevate cortisol    Need late-night salivary cortisol as the neck step to workup further    Instructions for salivary cortisol test    1. Do not brush teeth or floss  before collecting specimen.  2. Do not eat or drink for 30 minutes prior to specimen collection.  3. 24 hours before collection - do not use any creams or lotion that contains steroids such as         hydrocortisone or use any steroid inhalers.These products may contaminate the absorbent.  4. Avoid activities that may cause gum bleeding before collection      Night 1- Put the absorbent pad under your tongue between 11p-midnight for four (4) minutes, label with name, date of birth, collection date and collection time. Place pad in the freezer.     Night 5- Put the absorbent pad under your tongue between 11p-midnight for four (4) minutes, label with name, date of birth, collection date and collection time. Place pad in the freezer.      Absorbent pads are to remain frozen until you can drop them off to [facility] with orders or our lab in the office.

## 2024-11-19 NOTE — TELEPHONE ENCOUNTER
Attempted to call. Unsuccessful. Left msg for Lisa Holden to check mychart or to give us a call back at the office. A callback number was left.

## 2024-11-25 ENCOUNTER — OFFICE VISIT (OUTPATIENT)
Age: 59
End: 2024-11-25
Payer: COMMERCIAL

## 2024-11-25 VITALS
OXYGEN SATURATION: 96 % | HEIGHT: 65 IN | WEIGHT: 293 LBS | BODY MASS INDEX: 48.82 KG/M2 | TEMPERATURE: 97.7 F | SYSTOLIC BLOOD PRESSURE: 115 MMHG | RESPIRATION RATE: 18 BRPM | DIASTOLIC BLOOD PRESSURE: 76 MMHG | HEART RATE: 96 BPM

## 2024-11-25 DIAGNOSIS — G47.33 OSA (OBSTRUCTIVE SLEEP APNEA): ICD-10-CM

## 2024-11-25 DIAGNOSIS — G93.2 IIH (IDIOPATHIC INTRACRANIAL HYPERTENSION): ICD-10-CM

## 2024-11-25 DIAGNOSIS — E11.65 TYPE 2 DIABETES MELLITUS WITH HYPERGLYCEMIA, WITHOUT LONG-TERM CURRENT USE OF INSULIN (HCC): ICD-10-CM

## 2024-11-25 DIAGNOSIS — E66.01 CLASS 3 SEVERE OBESITY DUE TO EXCESS CALORIES WITH SERIOUS COMORBIDITY AND BODY MASS INDEX (BMI) OF 50.0 TO 59.9 IN ADULT: ICD-10-CM

## 2024-11-25 DIAGNOSIS — E03.9 ACQUIRED HYPOTHYROIDISM: ICD-10-CM

## 2024-11-25 DIAGNOSIS — Z13.220 SCREENING FOR HYPERCHOLESTEROLEMIA: ICD-10-CM

## 2024-11-25 DIAGNOSIS — T14.90XA TRAUMA IN CHILDHOOD: ICD-10-CM

## 2024-11-25 DIAGNOSIS — E66.813 CLASS 3 SEVERE OBESITY DUE TO EXCESS CALORIES WITH SERIOUS COMORBIDITY AND BODY MASS INDEX (BMI) OF 50.0 TO 59.9 IN ADULT: ICD-10-CM

## 2024-11-25 DIAGNOSIS — E66.813 CLASS 3 SEVERE OBESITY DUE TO EXCESS CALORIES WITH SERIOUS COMORBIDITY AND BODY MASS INDEX (BMI) OF 50.0 TO 59.9 IN ADULT: Primary | ICD-10-CM

## 2024-11-25 DIAGNOSIS — E66.01 CLASS 3 SEVERE OBESITY DUE TO EXCESS CALORIES WITH SERIOUS COMORBIDITY AND BODY MASS INDEX (BMI) OF 50.0 TO 59.9 IN ADULT: Primary | ICD-10-CM

## 2024-11-25 DIAGNOSIS — I10 PRIMARY HYPERTENSION: ICD-10-CM

## 2024-11-25 PROCEDURE — 3078F DIAST BP <80 MM HG: CPT | Performed by: FAMILY MEDICINE

## 2024-11-25 PROCEDURE — 99214 OFFICE O/P EST MOD 30 MIN: CPT | Performed by: FAMILY MEDICINE

## 2024-11-25 PROCEDURE — 3074F SYST BP LT 130 MM HG: CPT | Performed by: FAMILY MEDICINE

## 2024-11-25 RX ORDER — ETODOLAC 400 MG/1
400 TABLET, FILM COATED ORAL 2 TIMES DAILY
COMMUNITY
Start: 2024-10-25

## 2024-11-25 ASSESSMENT — PATIENT HEALTH QUESTIONNAIRE - PHQ9
SUM OF ALL RESPONSES TO PHQ QUESTIONS 1-9: 2
1. LITTLE INTEREST OR PLEASURE IN DOING THINGS: SEVERAL DAYS
SUM OF ALL RESPONSES TO PHQ QUESTIONS 1-9: 2
SUM OF ALL RESPONSES TO PHQ9 QUESTIONS 1 & 2: 2
SUM OF ALL RESPONSES TO PHQ QUESTIONS 1-9: 2
SUM OF ALL RESPONSES TO PHQ QUESTIONS 1-9: 2
2. FEELING DOWN, DEPRESSED OR HOPELESS: SEVERAL DAYS

## 2024-11-25 NOTE — PROGRESS NOTES
Identified pt with two pt identifiers (name and ). Reviewed chart in preparation for visit and have obtained necessary documentation.    Lisa Holden is a 59 y.o. female  Chief Complaint   Patient presents with    New Patient     /76 (Site: Left Upper Arm, Position: Sitting, Cuff Size: Large Adult)   Pulse 96   Temp 97.7 °F (36.5 °C) (Oral)   Resp 18   Ht 1.651 m (5' 5\")   Wt (!) 143.6 kg (316 lb 9.6 oz)   SpO2 96%   BMI 52.68 kg/m²     1. Have you been to the ER, urgent care clinic since your last visit?  Hospitalized since your last visit?no    2. Have you seen or consulted any other health care providers outside of the [facility] since your last visit?  Include any pap smears or colon screening. no    
packs/day: 0.00     Types: Cigarettes     Quit date: 2/15/2013     Years since quittin.7    Smokeless tobacco: Never    Tobacco comments:     actional smoker   Substance Use Topics    Alcohol use: Not Currently     Comment: once a year       Exercise  I personally reviewed the Medical Screening [employer] completed by the patient and scanned into media section of chart.  MINUTES 0 and  0 times a week      Review of Systems  See HPI        Objective  /76 (Site: Left Upper Arm, Position: Sitting, Cuff Size: Large Adult)   Pulse 96   Temp 97.7 °F (36.5 °C) (Oral)   Resp 18   Ht 1.651 m (5' 5\")   Wt (!) 143.6 kg (316 lb 9.6 oz)   SpO2 96%   BMI 52.68 kg/m²           2024     1:52 PM 2024     1:00 PM 10/11/2024     1:31 PM 2024    11:42 AM 2024    10:15 AM 2024     1:32 PM 2024     1:41 PM   Weight Metrics   Weight 316 lb 9.6 oz  320 lb 320 lb 320 lb 319 lb 319 lb   Neck (Inches)  14.25 in        Waist Measure Inches  49.5 in        Body Fat %  50.8 %        BMI (Calculated) 52.8 kg/m2  53.4 kg/m2 0 kg/m2 53.4 kg/m2 53.2 kg/m2 53.2 kg/m2          No data to display                       Physical Exam    Vital Signs Reviewed  Weight Management Metrics Reviewed    Appearance: wekl  HEENT:  Scleral icterus?  no  Neck:  Thyromegaly or nodules?  no  Mouth: Large tongue yes malampattii 4  Heart:  RRR  Lungs:  clear  Abdomen:     Hepatomegaly? no   Striae present? no  Skin:    Acne?  no   Hirsutism?  no   Skin tags?  yes   Acanthosis Nigricans?  yes  Ext:  Edema?  no      Assessment & Plan  Encounter Diagnoses   Name Primary?    Class 3 severe obesity due to excess calories with serious comorbidity and body mass index (BMI) of 50.0 to 59.9 in adult Yes    Type 2 diabetes mellitus with hyperglycemia, without long-term current use of insulin (HCC)     Primary hypertension     Acquired hypothyroidism     ELLIE (obstructive sleep apnea)     IIH (idiopathic intracranial

## 2024-12-02 ENCOUNTER — ANESTHESIA EVENT (OUTPATIENT)
Facility: HOSPITAL | Age: 59
End: 2024-12-02
Payer: COMMERCIAL

## 2024-12-02 NOTE — ANESTHESIA PRE PROCEDURE
Department of Anesthesiology  Preprocedure Note       Name:  Lisa Holden   Age:  59 y.o.  :  1965                                          MRN:  [id number]         Date:  2024      Surgeon: Surgeon(s):  Kandy Mensah MD    Procedure: Procedure(s):  ESOPHAGOGASTRODUODENOSCOPY BIOPSY    Medications prior to admission:   Prior to Admission medications    Medication Sig Start Date End Date Taking? Authorizing Provider   etodolac (LODINE) 400 MG tablet Take 1 tablet by mouth 2 times daily 10/25/24   Evelyn Vasquez MD   TRULICITY 1.5 MG/0.5ML SC injection INJECT 1/2 (ONE-HALF) ML SUBCUTANEOUSLY  ONCE A WEEK 24   Lisette Gavin DO   cetirizine (ZYRTEC) 10 MG tablet Take 1 tablet by mouth daily 10/22/24   Lisette Gavin DO   diclofenac sodium (VOLTAREN) 1 % GEL Apply 2 g topically 4 times daily as needed for Pain  Patient not taking: Reported on 2024    Evelyn Vasquez MD   dexAMETHasone (DECADRON) 1 MG tablet 1 tablet at 11 PM, and go to the lab to have the blood drawn for cortisol before 9 AM the next morning  Patient not taking: Reported on 2024 10/11/24   Pauline Flores MD   diclofenac sodium (VOLTAREN) 1 % GEL Apply 2 g topically 4 times daily 9/10/24 10/11/24  Evelyn Vasquez MD   ciclopirox (LOPROX) 0.77 % cream Apply topically 2 times daily to affected toenails.  Patient not taking: Reported on 2024   Richard Johnston DPM   levothyroxine (SYNTHROID) 150 MCG tablet Take 1 tablet by mouth Daily 24   Pauline Flores MD   blood glucose monitor kit and supplies Dispense sufficient amount for indicated testing frequency plus additional to accommodate PRN testing needs. Dispense all needed supplies to include: monitor, strips, lancing device, lancets, control solutions, alcohol swabs. 24   Lisette Gavin DO   acetaZOLAMIDE (DIAMOX) 250 MG tablet Take 1 tablet by mouth daily 23   Evelyn Vasquez MD   aspirin 81 MG

## 2024-12-03 ENCOUNTER — HOSPITAL ENCOUNTER (OUTPATIENT)
Facility: HOSPITAL | Age: 59
Setting detail: OUTPATIENT SURGERY
Discharge: HOME | End: 2024-12-03
Attending: INTERNAL MEDICINE | Admitting: INTERNAL MEDICINE
Payer: COMMERCIAL

## 2024-12-03 ENCOUNTER — ANESTHESIA (OUTPATIENT)
Facility: HOSPITAL | Age: 59
End: 2024-12-03
Payer: COMMERCIAL

## 2024-12-03 VITALS
TEMPERATURE: 97.6 F | WEIGHT: 293 LBS | HEART RATE: 95 BPM | BODY MASS INDEX: 48.82 KG/M2 | HEIGHT: 65 IN | OXYGEN SATURATION: 98 % | DIASTOLIC BLOOD PRESSURE: 86 MMHG | RESPIRATION RATE: 18 BRPM | SYSTOLIC BLOOD PRESSURE: 125 MMHG

## 2024-12-03 DIAGNOSIS — R13.10 DYSPHAGIA, UNSPECIFIED TYPE: ICD-10-CM

## 2024-12-03 LAB
GLUCOSE BLD STRIP.AUTO-MCNC: 109 MG/DL (ref 65–100)
PERFORMED BY:: ABNORMAL

## 2024-12-03 PROCEDURE — 3600007502: Performed by: INTERNAL MEDICINE

## 2024-12-03 PROCEDURE — 6360000002 HC RX W HCPCS: Performed by: NURSE ANESTHETIST, CERTIFIED REGISTERED

## 2024-12-03 PROCEDURE — 88342 IMHCHEM/IMCYTCHM 1ST ANTB: CPT

## 2024-12-03 PROCEDURE — 82962 GLUCOSE BLOOD TEST: CPT

## 2024-12-03 PROCEDURE — 3700000000 HC ANESTHESIA ATTENDED CARE: Performed by: INTERNAL MEDICINE

## 2024-12-03 PROCEDURE — 3700000001 HC ADD 15 MINUTES (ANESTHESIA): Performed by: INTERNAL MEDICINE

## 2024-12-03 PROCEDURE — 2709999900 HC NON-CHARGEABLE SUPPLY: Performed by: INTERNAL MEDICINE

## 2024-12-03 PROCEDURE — 3600007512: Performed by: INTERNAL MEDICINE

## 2024-12-03 PROCEDURE — 7100000010 HC PHASE II RECOVERY - FIRST 15 MIN: Performed by: INTERNAL MEDICINE

## 2024-12-03 PROCEDURE — 88305 TISSUE EXAM BY PATHOLOGIST: CPT

## 2024-12-03 PROCEDURE — 7100000011 HC PHASE II RECOVERY - ADDTL 15 MIN: Performed by: INTERNAL MEDICINE

## 2024-12-03 RX ORDER — LIDOCAINE HYDROCHLORIDE 20 MG/ML
INJECTION, SOLUTION EPIDURAL; INFILTRATION; INTRACAUDAL; PERINEURAL
Status: DISCONTINUED | OUTPATIENT
Start: 2024-12-03 | End: 2024-12-03 | Stop reason: SDUPTHER

## 2024-12-03 RX ORDER — PROPOFOL 10 MG/ML
INJECTION, EMULSION INTRAVENOUS
Status: DISCONTINUED | OUTPATIENT
Start: 2024-12-03 | End: 2024-12-03 | Stop reason: SDUPTHER

## 2024-12-03 RX ADMIN — PROPOFOL 100 MG: 10 INJECTION, EMULSION INTRAVENOUS at 10:01

## 2024-12-03 RX ADMIN — LIDOCAINE HYDROCHLORIDE 60 MG: 20 SOLUTION INTRAVENOUS at 10:01

## 2024-12-03 RX ADMIN — PROPOFOL 40 MG: 10 INJECTION, EMULSION INTRAVENOUS at 10:04

## 2024-12-03 ASSESSMENT — PAIN - FUNCTIONAL ASSESSMENT
PAIN_FUNCTIONAL_ASSESSMENT: NONE - DENIES PAIN

## 2024-12-03 NOTE — ANESTHESIA POSTPROCEDURE EVALUATION
Department of Anesthesiology  Postprocedure Note    Patient: Lisa Holden  MRN: [id number]  YOB: 1965  Date of evaluation: 12/3/2024    Procedure Summary       Date: 12/03/24 Room / Location: [facility] ENDO 03 / [facility] END[facility]COPY    Anesthesia Start: 0954 Anesthesia Stop: 1012    Procedure: ESOPHAGOGASTRODUODENOSCOPY BIOPSY Diagnosis:       Dysphagia, unspecified type      (Dysphagia, unspecified type [R13.10])    Surgeons: Kandy Mensah MD Responsible Provider: Frank Garcia MD    Anesthesia Type: MAC ASA Status: 3            Anesthesia Type: MAC    Brian Phase I: Brian Score: 10    Brian Phase II:      Anesthesia Post Evaluation    Patient location during evaluation: bedside  Patient participation: complete - patient participated  Level of consciousness: lethargic  Pain score: 0  Airway patency: patent  Nausea & Vomiting: no nausea and no vomiting  Cardiovascular status: hemodynamically stable  Respiratory status: acceptable  Hydration status: stable  Comments: VSS. Report to RN. Remains on stretcher  Pain management: adequate        No notable events documented.

## 2024-12-03 NOTE — PERIOP NOTE
Discharge instructions printed and provided to patient with all questions answered in full. PIV x1 removed with cath tip intact. Pt VSS and no signs of distress noted. Pt tolerating liquids and solids with no issues. Pt ambulating within room with no issues. Pt wheeled down to main entrance accompanied by staff. Pt condition stable at time of discharge.      Patient requested for Dr. Mensah to call with any results or concerns.

## 2024-12-09 ENCOUNTER — TELEMEDICINE (OUTPATIENT)
Age: 59
End: 2024-12-09
Payer: COMMERCIAL

## 2024-12-09 DIAGNOSIS — E66.813 CLASS 3 SEVERE OBESITY DUE TO EXCESS CALORIES WITH SERIOUS COMORBIDITY AND BODY MASS INDEX (BMI) OF 50.0 TO 59.9 IN ADULT: Primary | ICD-10-CM

## 2024-12-09 DIAGNOSIS — E66.01 CLASS 3 SEVERE OBESITY DUE TO EXCESS CALORIES WITH SERIOUS COMORBIDITY AND BODY MASS INDEX (BMI) OF 50.0 TO 59.9 IN ADULT: Primary | ICD-10-CM

## 2024-12-09 PROCEDURE — 97802 MEDICAL NUTRITION INDIV IN: CPT | Performed by: DIETITIAN, REGISTERED

## 2024-12-09 NOTE — PROGRESS NOTES
strategy.  routine meal patterns of eating or drinking a meal replacement shake approx. every 3-4 hours.  Snacks consisting of lean protein, fiber, and optional healthy fat     Handouts Provided: [x]  Carbohydrates  [x]  Protein  [x]  Fiber  [x]  Serving Sizes  [x]  Meal and Snack Ideas  []  Food Journals []  Diabetes  []  Cholesterol  []  Sodium  [x]  Gen Nutr Guidelines  []  SBGM Guidelines  []  Others:   Information Reviewed with: patient   Readiness to Change Stage: []  Pre-contemplative    []  Contemplative  [x]  Preparation               []  Action                  []  Maintenance   Potential Barriers to Learning: []  Decline in memory    []  Language barrier   []  Other:  []  Emotional                  []  Limited mobility  []  Lack of motivation     [] Vision, hearing or cognitive impairment   Expected Compliance: Fair     Nutritional Goal - To promote lifestyle changes to result in:    [x]  Weight loss  []  Improved diabetic control  []  Decreased cholesterol levels  []  Decreased blood pressure  []  Weight maintenance []  Preventing any interactions associated with food allergies  []  Adequate weight gain toward goal weight  []  Other:        Patient Goals:  “SMART goals” -Review education material emailed today.      -Consider 1-2 protein shakes per day in place of skipping and/or in places where you feel ravenous,or grazing all day.  Shake criteria per servin-250 calories  30+ grams protein  Single digits sugar    -Increase plain water with goal to minimum 64 ounces per day, up to 80 ounces per day. Refrain from artificial sweeteners if possible.    -Take buns off sandwiches out to eat.  Ensure the meat portion of plate is larger than the starch portion of the plate.    The following are future goals we plan to at f/u visits:  Improve consistency of CHO intake w/goal to plan meals with 15-20 gm CHO/meal and 1-2 optional snack of 15-20 gm.    - Improve physical activity w/goal to increase as

## 2025-01-15 ENCOUNTER — OFFICE VISIT (OUTPATIENT)
Age: 60
End: 2025-01-15
Payer: COMMERCIAL

## 2025-01-15 ENCOUNTER — TELEPHONE (OUTPATIENT)
Age: 60
End: 2025-01-15

## 2025-01-15 VITALS
BODY MASS INDEX: 48.82 KG/M2 | DIASTOLIC BLOOD PRESSURE: 64 MMHG | RESPIRATION RATE: 16 BRPM | SYSTOLIC BLOOD PRESSURE: 100 MMHG | HEART RATE: 100 BPM | WEIGHT: 293 LBS | HEIGHT: 65 IN | OXYGEN SATURATION: 98 % | TEMPERATURE: 97.7 F

## 2025-01-15 DIAGNOSIS — T14.90XA TRAUMA IN CHILDHOOD: ICD-10-CM

## 2025-01-15 DIAGNOSIS — G93.2 IIH (IDIOPATHIC INTRACRANIAL HYPERTENSION): ICD-10-CM

## 2025-01-15 DIAGNOSIS — E66.813 CLASS 3 SEVERE OBESITY DUE TO EXCESS CALORIES WITH SERIOUS COMORBIDITY AND BODY MASS INDEX (BMI) OF 50.0 TO 59.9 IN ADULT: Primary | ICD-10-CM

## 2025-01-15 DIAGNOSIS — G47.33 OSA (OBSTRUCTIVE SLEEP APNEA): ICD-10-CM

## 2025-01-15 DIAGNOSIS — E11.65 TYPE 2 DIABETES MELLITUS WITH HYPERGLYCEMIA, WITHOUT LONG-TERM CURRENT USE OF INSULIN (HCC): ICD-10-CM

## 2025-01-15 DIAGNOSIS — I10 PRIMARY HYPERTENSION: ICD-10-CM

## 2025-01-15 DIAGNOSIS — E66.01 CLASS 3 SEVERE OBESITY DUE TO EXCESS CALORIES WITH SERIOUS COMORBIDITY AND BODY MASS INDEX (BMI) OF 50.0 TO 59.9 IN ADULT: Primary | ICD-10-CM

## 2025-01-15 DIAGNOSIS — E03.9 ACQUIRED HYPOTHYROIDISM: ICD-10-CM

## 2025-01-15 PROCEDURE — 99213 OFFICE O/P EST LOW 20 MIN: CPT | Performed by: FAMILY MEDICINE

## 2025-01-15 PROCEDURE — 3078F DIAST BP <80 MM HG: CPT | Performed by: FAMILY MEDICINE

## 2025-01-15 PROCEDURE — 3074F SYST BP LT 130 MM HG: CPT | Performed by: FAMILY MEDICINE

## 2025-01-15 RX ORDER — AMMONIUM LACTATE 12 G/100G
CREAM TOPICAL PRN
COMMUNITY
Start: 2024-12-05

## 2025-01-15 ASSESSMENT — PATIENT HEALTH QUESTIONNAIRE - PHQ9
SUM OF ALL RESPONSES TO PHQ QUESTIONS 1-9: 0
1. LITTLE INTEREST OR PLEASURE IN DOING THINGS: NOT AT ALL
SUM OF ALL RESPONSES TO PHQ9 QUESTIONS 1 & 2: 0
2. FEELING DOWN, DEPRESSED OR HOPELESS: NOT AT ALL
SUM OF ALL RESPONSES TO PHQ QUESTIONS 1-9: 0

## 2025-01-15 NOTE — TELEPHONE ENCOUNTER
Pt was upset stated she missed a call for results in Nov, called back and did not receive a call in return. Informed pt that a [employer] msg was sent to her as well and it showed that she had read it. And no further response from pt was received after reading the [employer] msg. Pt stated she did read it but had questions. Went over results with pt. Pt verbalized understanding with no further questions or concerns at this time. Will come  the kit tomorrow.

## 2025-01-15 NOTE — PROGRESS NOTES
Identified pt with two pt identifiers (name and ). Reviewed chart in preparation for visit and have obtained necessary documentation.    Lisa Holden is a 59 y.o. female  Chief Complaint   Patient presents with    Weight Management     MWL/ 1 month      /64 (Site: Left Upper Arm, Position: Sitting, Cuff Size: Large Adult)   Pulse 100   Temp 97.7 °F (36.5 °C) (Oral)   Resp 16   Ht 1.651 m (5' 5\")   Wt (!) 143 kg (315 lb 3.2 oz)   SpO2 98%   BMI 52.45 kg/m²     1. Have you been to the ER, urgent care clinic since your last visit?  Hospitalized since your last visit?no    2. Have you seen or consulted any other health care providers outside of the [facility] since your last visit?  Include any pap smears or colon screening. No    Patient attended triage but did not bring homework form. Patient instructed to email or fax completed homework form to us. Patient informed that not bringing the homework form can result in not being seen next time.       
Patient Instructions on file for this visit.         The primary encounter diagnosis was Class 3 severe obesity due to excess calories with serious comorbidity and body mass index (BMI) of 50.0 to 59.9 in adult. Diagnoses of Type 2 diabetes mellitus with hyperglycemia, without long-term current use of insulin (HCC), Primary hypertension, Acquired hypothyroidism, ELLIE (obstructive sleep apnea), IIH (idiopathic intracranial hypertension), and Trauma in childhood were also pertinent to this visit.  The patient verbalizes understanding and agrees with the plan of care

## 2025-01-20 ENCOUNTER — TELEMEDICINE (OUTPATIENT)
Age: 60
End: 2025-01-20

## 2025-01-20 DIAGNOSIS — E66.813 CLASS 3 SEVERE OBESITY DUE TO EXCESS CALORIES WITH SERIOUS COMORBIDITY AND BODY MASS INDEX (BMI) OF 50.0 TO 59.9 IN ADULT: Primary | ICD-10-CM

## 2025-01-20 DIAGNOSIS — E66.01 CLASS 3 SEVERE OBESITY DUE TO EXCESS CALORIES WITH SERIOUS COMORBIDITY AND BODY MASS INDEX (BMI) OF 50.0 TO 59.9 IN ADULT: Primary | ICD-10-CM

## 2025-01-20 NOTE — PROGRESS NOTES
discussed today              Patient Education:  [x]  Review current plan with patient   []  Other:    Handouts/  Information Provided: []  Carbohydrates  []  Protein  []  Fiber  []  Serving Sizes  []  Fluids  []  General guidelines []  Diabetes  []  Cholesterol  []  Sodium  []  SBGM  []  Food Journals  []  Others:      New Patient Goals: -continue current goals above.     PLAN    [x]  Continue on current plan []  Follow-up PRN   []  Discharge due to :    []  Next appt:      Dietitian: Lisette Cadena RD    Date: 1/20/2025 Time: 11:01 AM

## 2025-01-24 ENCOUNTER — OFFICE VISIT (OUTPATIENT)
Facility: CLINIC | Age: 60
End: 2025-01-24
Payer: COMMERCIAL

## 2025-01-24 VITALS
BODY MASS INDEX: 53.25 KG/M2 | SYSTOLIC BLOOD PRESSURE: 133 MMHG | HEART RATE: 98 BPM | TEMPERATURE: 97.9 F | WEIGHT: 293 LBS | DIASTOLIC BLOOD PRESSURE: 77 MMHG

## 2025-01-24 DIAGNOSIS — M54.50 CHRONIC BILATERAL LOW BACK PAIN WITHOUT SCIATICA: ICD-10-CM

## 2025-01-24 DIAGNOSIS — Z88.9 HISTORY OF ALLERGIC REACTION: ICD-10-CM

## 2025-01-24 DIAGNOSIS — G89.29 CHRONIC BILATERAL LOW BACK PAIN WITHOUT SCIATICA: ICD-10-CM

## 2025-01-24 DIAGNOSIS — G93.2 IIH (IDIOPATHIC INTRACRANIAL HYPERTENSION): ICD-10-CM

## 2025-01-24 DIAGNOSIS — I10 PRIMARY HYPERTENSION: Primary | ICD-10-CM

## 2025-01-24 DIAGNOSIS — E11.65 TYPE 2 DIABETES MELLITUS WITH HYPERGLYCEMIA, WITHOUT LONG-TERM CURRENT USE OF INSULIN (HCC): ICD-10-CM

## 2025-01-24 DIAGNOSIS — E03.9 ACQUIRED HYPOTHYROIDISM: ICD-10-CM

## 2025-01-24 PROBLEM — R22.0 FACIAL SWELLING: Status: RESOLVED | Noted: 2023-08-30 | Resolved: 2025-01-24

## 2025-01-24 PROCEDURE — 3078F DIAST BP <80 MM HG: CPT | Performed by: STUDENT IN AN ORGANIZED HEALTH CARE EDUCATION/TRAINING PROGRAM

## 2025-01-24 PROCEDURE — 3075F SYST BP GE 130 - 139MM HG: CPT | Performed by: STUDENT IN AN ORGANIZED HEALTH CARE EDUCATION/TRAINING PROGRAM

## 2025-01-24 PROCEDURE — 99214 OFFICE O/P EST MOD 30 MIN: CPT | Performed by: STUDENT IN AN ORGANIZED HEALTH CARE EDUCATION/TRAINING PROGRAM

## 2025-01-24 RX ORDER — EPINEPHRINE 0.3 MG/.3ML
0.3 INJECTION SUBCUTANEOUS
Qty: 0.3 ML | Refills: 1 | Status: SHIPPED | OUTPATIENT
Start: 2025-01-24 | End: 2025-01-24

## 2025-01-24 RX ORDER — SYRING-NEEDL,DISP,INSUL,0.3 ML 30 GX5/16"
1 SYRINGE, EMPTY DISPOSABLE MISCELLANEOUS ONCE
Qty: 100 EACH | Refills: 5 | Status: SHIPPED | OUTPATIENT
Start: 2025-01-24 | End: 2025-01-24

## 2025-01-24 RX ORDER — DULAGLUTIDE 1.5 MG/.5ML
1.5 INJECTION, SOLUTION SUBCUTANEOUS WEEKLY
Qty: 4 ADJUSTABLE DOSE PRE-FILLED PEN SYRINGE | Refills: 2 | Status: SHIPPED | OUTPATIENT
Start: 2025-01-24

## 2025-01-24 RX ORDER — BLOOD-GLUCOSE METER
1 KIT MISCELLANEOUS DAILY
Qty: 1 KIT | Refills: 0 | Status: SHIPPED | OUTPATIENT
Start: 2025-01-24

## 2025-01-24 RX ORDER — UBIQUINOL 100 MG
1 CAPSULE ORAL DAILY
Qty: 200 EACH | Refills: 5 | Status: SHIPPED | OUTPATIENT
Start: 2025-01-24

## 2025-01-24 RX ORDER — GLUCOSAMINE HCL/CHONDROITIN SU 500-400 MG
CAPSULE ORAL
Qty: 100 STRIP | Refills: 5 | Status: SHIPPED | OUTPATIENT
Start: 2025-01-24

## 2025-01-24 SDOH — ECONOMIC STABILITY: INCOME INSECURITY: IN THE LAST 12 MONTHS, WAS THERE A TIME WHEN YOU WERE NOT ABLE TO PAY THE MORTGAGE OR RENT ON TIME?: PATIENT DECLINED

## 2025-01-24 SDOH — ECONOMIC STABILITY: FOOD INSECURITY: WITHIN THE PAST 12 MONTHS, THE FOOD YOU BOUGHT JUST DIDN'T LAST AND YOU DIDN'T HAVE MONEY TO GET MORE.: SOMETIMES TRUE

## 2025-01-24 SDOH — ECONOMIC STABILITY: TRANSPORTATION INSECURITY
IN THE PAST 12 MONTHS, HAS LACK OF TRANSPORTATION KEPT YOU FROM MEETINGS, WORK, OR FROM GETTING THINGS NEEDED FOR DAILY LIVING?: NO

## 2025-01-24 SDOH — ECONOMIC STABILITY: FOOD INSECURITY: WITHIN THE PAST 12 MONTHS, YOU WORRIED THAT YOUR FOOD WOULD RUN OUT BEFORE YOU GOT MONEY TO BUY MORE.: SOMETIMES TRUE

## 2025-01-24 SDOH — ECONOMIC STABILITY: TRANSPORTATION INSECURITY
IN THE PAST 12 MONTHS, HAS THE LACK OF TRANSPORTATION KEPT YOU FROM MEDICAL APPOINTMENTS OR FROM GETTING MEDICATIONS?: NO

## 2025-01-24 NOTE — PROGRESS NOTES
[facility]  Subjective  Chief Complaint   Patient presents with    Follow-up Chronic Condition     HPI:  Lisa Holden  : 1965    PCP: Lisette Gavin DO    History of Present Illness  The patient is a 59-year-old female who presents today for follow-up on type 2 diabetes, hypertension, and constipation. She is being managed by Dr. Dixon in neurology for her intracranial hypertension and is following up with endocrinology for her thyroid.    She reports no significant changes in her health status since the last visit. She has been under the care of a weight  and a nutritionist, with a gradual improvement in her condition. She has been prescribed protein shakes due to her inadequate dietary intake. She is currently on Trulicity and reports no side effects. She expresses confusion about her diabetes diagnosis, as she does not experience symptoms such as excessive urination, thirst, or dry mouth. Her last A1c reading was 6. She consumed Oreo cookies this morning, which may have elevated her blood sugar levels. She has not yet received her glucometer, despite a previous prescription.    She continues to experience low back pain, which she attributes to a slipped disc, nerve impingement, and arthritis around her L4 and L5 vertebrae. She underwent a procedure with Dr. Krause on 2024, which was unsuccessful. She has been referred to another physician for further evaluation. If this treatment also fails, she is considering back surgery after a year. She has been prescribed an ointment and a muscle relaxant by Dr. Linda, but these have not provided relief. She also takes another medication, the name of which she can not recall. She experiences sciatica-like symptoms, with pain radiating down her leg upon certain movements. She has been advised to avoid falls due to the presence of stents in her head. She has tried lidocaine patches, but they did

## 2025-01-24 NOTE — PROGRESS NOTES
Chief Complaint   Patient presents with    Follow-up Chronic Condition        /77 (Site: Left Upper Arm, Position: Sitting, Cuff Size: Large Adult)   Pulse 98   Temp 97.9 °F (36.6 °C)   Wt (!) 145.2 kg (320 lb)   BMI 53.25 kg/m²      PHQ-9 score is    Negative         No data to display                   1. \"Have you been to the ER, urgent care clinic since your last visit?  Hospitalized since your last visit?\" No    2. \"Have you seen or consulted any other health care providers outside of the [facility] since your last visit?\" No     3. For patients aged 45-75: Has the patient had a colonoscopy / FIT/ Cologuard? Yes - no Care Gap present      If the patient is female:    4. For patients aged 40-74: Has the patient had a mammogram within the past 2 years? Yes - no Care Gap present      5. For patients aged 21-65: Has the patient had a pap smear? Yes - no Care Gap present

## 2025-02-26 ENCOUNTER — OFFICE VISIT (OUTPATIENT)
Age: 60
End: 2025-02-26
Payer: COMMERCIAL

## 2025-02-26 VITALS
HEIGHT: 65 IN | RESPIRATION RATE: 16 BRPM | BODY MASS INDEX: 48.82 KG/M2 | HEART RATE: 100 BPM | DIASTOLIC BLOOD PRESSURE: 77 MMHG | SYSTOLIC BLOOD PRESSURE: 111 MMHG | WEIGHT: 293 LBS | OXYGEN SATURATION: 97 % | TEMPERATURE: 97.5 F

## 2025-02-26 DIAGNOSIS — E11.65 TYPE 2 DIABETES MELLITUS WITH HYPERGLYCEMIA, WITHOUT LONG-TERM CURRENT USE OF INSULIN (HCC): ICD-10-CM

## 2025-02-26 DIAGNOSIS — I10 PRIMARY HYPERTENSION: ICD-10-CM

## 2025-02-26 DIAGNOSIS — E66.813 CLASS 3 SEVERE OBESITY DUE TO EXCESS CALORIES WITH SERIOUS COMORBIDITY AND BODY MASS INDEX (BMI) OF 50.0 TO 59.9 IN ADULT: Primary | ICD-10-CM

## 2025-02-26 DIAGNOSIS — E66.01 CLASS 3 SEVERE OBESITY DUE TO EXCESS CALORIES WITH SERIOUS COMORBIDITY AND BODY MASS INDEX (BMI) OF 50.0 TO 59.9 IN ADULT: Primary | ICD-10-CM

## 2025-02-26 DIAGNOSIS — T14.90XA TRAUMA IN CHILDHOOD: ICD-10-CM

## 2025-02-26 DIAGNOSIS — G93.2 IIH (IDIOPATHIC INTRACRANIAL HYPERTENSION): ICD-10-CM

## 2025-02-26 DIAGNOSIS — E03.9 ACQUIRED HYPOTHYROIDISM: ICD-10-CM

## 2025-02-26 DIAGNOSIS — G47.33 OSA (OBSTRUCTIVE SLEEP APNEA): ICD-10-CM

## 2025-02-26 PROCEDURE — 3078F DIAST BP <80 MM HG: CPT | Performed by: FAMILY MEDICINE

## 2025-02-26 PROCEDURE — 3074F SYST BP LT 130 MM HG: CPT | Performed by: FAMILY MEDICINE

## 2025-02-26 PROCEDURE — 99213 OFFICE O/P EST LOW 20 MIN: CPT | Performed by: FAMILY MEDICINE

## 2025-02-26 RX ORDER — ACETAZOLAMIDE 500 MG/1
500 CAPSULE, EXTENDED RELEASE ORAL 2 TIMES DAILY
COMMUNITY
Start: 2025-02-25

## 2025-02-26 ASSESSMENT — PATIENT HEALTH QUESTIONNAIRE - PHQ9
SUM OF ALL RESPONSES TO PHQ QUESTIONS 1-9: 0
1. LITTLE INTEREST OR PLEASURE IN DOING THINGS: NOT AT ALL
SUM OF ALL RESPONSES TO PHQ QUESTIONS 1-9: 0
SUM OF ALL RESPONSES TO PHQ QUESTIONS 1-9: 0
2. FEELING DOWN, DEPRESSED OR HOPELESS: NOT AT ALL
SUM OF ALL RESPONSES TO PHQ9 QUESTIONS 1 & 2: 0
SUM OF ALL RESPONSES TO PHQ QUESTIONS 1-9: 0

## 2025-02-26 NOTE — PROGRESS NOTES
Weight Loss Progress Note: follow up Physician Visit      Lisa Holden is a 59 y.o. female  who is here for her follow up  Evaluation for the medical bariatric care.      CC: I want to be healthier  She feels that because has pseudotumor ceribri and hypothyroidism she cannot lose weight  She feels because she has stents in her head she cannot exercise   Her A1c last month was 6.1, the prior A1c 6.0. this suggests the trulicity is not working  Her pulse is 100 resting which is too fast for phentermine, stimulants or qsymia        Weight History  Current weight 317( 315) and BMI is Body mass index is 52.85 kg/m².  Goal weight BM, 29  Highest weight 317   (See weight gain time line scanned into media section of chart)    Hunger control: poor    Significant Medical History    Update on sleep apnea and  CPAP not over 6 hrs    Ever had bariatric surgery: no    Pregnant or planning on becoming pregnant w/in 6 months: no         Significant Psychosocial History     Current Major Lifestyle Changes: no  Any potential unsupportive people: no          Social History  Social History     Tobacco Use    Smoking status: Former     Current packs/day: 0.00     Types: Cigarettes     Quit date: 2/15/2013     Years since quittin.0    Smokeless tobacco: Never    Tobacco comments:     actional smoker   Substance Use Topics    Alcohol use: Not Currently     Comment: once a year     How many times a week do you eat out?  2-3    Do you drink any EtOH?  no   If so, how much?        Do you have upcoming any travel in the next 6 weeks?  no   If so, what do you have planned?          Exercise  How many days a week do you currently exercise?  Activity in the house days  Have you ever been told by a physician not to exercise?  no      Objective  /77 (Site: Left Upper Arm, Position: Sitting, Cuff Size: Large Adult)   Pulse 100   Temp 97.5 °F (36.4 °C) (Oral)   Resp 16   Ht 1.651 m (5' 5\")   Wt (!) 144.1 kg (317 lb 9.6 oz)

## 2025-02-26 NOTE — PROGRESS NOTES
Identified pt with two pt identifiers (name and ). Reviewed chart in preparation for visit and have obtained necessary documentation.    Lisa Holden is a 59 y.o. female  Chief Complaint   Patient presents with    Weight Management     WLC/ 1 month      /77 (Site: Left Upper Arm, Position: Sitting, Cuff Size: Large Adult)   Pulse 100   Temp 97.5 °F (36.4 °C) (Oral)   Resp 16   Ht 1.651 m (5' 5\")   Wt (!) 144.1 kg (317 lb 9.6 oz)   SpO2 97%   BMI 52.85 kg/m²     1. Have you been to the ER, urgent care clinic since your last visit?  Hospitalized since your last visit?no    2. Have you seen or consulted any other health care providers [facility] since your last visit?  Include any pap smears or colon screening. no    Patient attended triage but did not bring homework form. Patient instructed to email or fax completed homework form to us. Patient informed that not bringing the homework form can result in not being seen next time.

## 2025-03-03 ENCOUNTER — TELEMEDICINE (OUTPATIENT)
Age: 60
End: 2025-03-03

## 2025-03-03 DIAGNOSIS — E66.813 CLASS 3 SEVERE OBESITY DUE TO EXCESS CALORIES WITH SERIOUS COMORBIDITY AND BODY MASS INDEX (BMI) OF 50.0 TO 59.9 IN ADULT: Primary | ICD-10-CM

## 2025-03-03 DIAGNOSIS — E66.01 CLASS 3 SEVERE OBESITY DUE TO EXCESS CALORIES WITH SERIOUS COMORBIDITY AND BODY MASS INDEX (BMI) OF 50.0 TO 59.9 IN ADULT: Primary | ICD-10-CM

## 2025-03-03 NOTE — PROGRESS NOTES
NUTRITION - FOLLOW-UP TREATMENT NOTE  Patient Name: Lisa Holden         Date: 3/3/2025  : 1965    YES Patient  Verified    Consent:  Patient and/or their healthcare decision maker is aware that this patient-initiated Telehealth encounter is a billable service, with coverage as determined by her insurance carrier. They are aware that they may receive a bill and has provided verbal consent to proceed: yes, confirmed      Lisa Holden, was evaluated through a synchronous (real-time) audio-video encounter. The patient (or guardian if applicable) is aware that this is a billable service, which includes applicable co-pays. This Virtual Visit was conducted with patient's (and/or legal guardian's) consent. Patient identification was verified, and a caregiver was present when appropriate.   The patient was located at Home: [address] [place] [id number]  Provider was located at Home (not Appt Dept State): [place]  Confirm you are appropriately licensed, registered, or certified to deliver care in the state where the patient is located as indicated above. If you are not or unsure, please re-schedule the visit: Yes, I confirm.      Total time spent for this encounter:  60 min    --BERNA RUTH RD on 3/3/2025 at 11:01 AM    An electronic signature was used to authenticate this note.    Diagnosis: Obesity Class 3   In time:   11:01am             Out time:   12:01pm   Total Treatment Time (min):   60 min         SUBJECTIVE/ASSESSMENT    Changes in medication or medical history? Any new allergies, surgeries or procedures?     319# self report weight today.    3# loss x 30 days.  Overall 1# gain x 2 months.    [employer] protein shakes bought last month. Was having 1-2 of these a day. If hungry at night, was having a protein shake.  Not used to having breakfast, trying to have a protein shake in place of skipping breakfast.     Has not had protein shakes in several days.  Has not restocked these

## 2025-03-21 ENCOUNTER — TELEPHONE (OUTPATIENT)
Age: 60
End: 2025-03-21

## 2025-03-21 NOTE — TELEPHONE ENCOUNTER
I reached out to the pt and she confirmed that she has been seeing the weight loss doctor in [facility] by the name of dr Lance Avelar every two weeks, and she said that she is going to reach out to dr Gomez's  office to request the most recent office notes to be fax over to our office. 3/21/2025.

## 2025-03-24 DIAGNOSIS — E11.65 TYPE 2 DIABETES MELLITUS WITH HYPERGLYCEMIA, WITHOUT LONG-TERM CURRENT USE OF INSULIN (HCC): ICD-10-CM

## 2025-03-24 NOTE — TELEPHONE ENCOUNTER
Method of communication:  [x]Fax []Phone call []In person   []Other:    Who is making request: pharmacy  What medication/s (include strength and dosing):    Trulicity 1.5 inj    This is for a:   [x]Refill    []New medication request  []Follow up on prior request    Pharmacy: [facility]  Best contact for patient: [phone]    Additional notes:

## 2025-03-24 NOTE — TELEPHONE ENCOUNTER
Requested Prescriptions     Pending Prescriptions Disp Refills    dulaglutide (TRULICITY) 1.5 MG/0.5ML SC injection 4 Adjustable Dose Pre-filled Pen Syringe 2     Sig: Inject 0.5 mLs into the skin once a week

## 2025-03-25 RX ORDER — DULAGLUTIDE 1.5 MG/.5ML
1.5 INJECTION, SOLUTION SUBCUTANEOUS WEEKLY
Qty: 4 ADJUSTABLE DOSE PRE-FILLED PEN SYRINGE | Refills: 1 | Status: SHIPPED | OUTPATIENT
Start: 2025-03-25

## 2025-04-14 ENCOUNTER — OFFICE VISIT (OUTPATIENT)
Age: 60
End: 2025-04-14
Payer: COMMERCIAL

## 2025-04-14 VITALS
DIASTOLIC BLOOD PRESSURE: 79 MMHG | BODY MASS INDEX: 48.82 KG/M2 | OXYGEN SATURATION: 97 % | HEIGHT: 65 IN | WEIGHT: 293 LBS | RESPIRATION RATE: 16 BRPM | HEART RATE: 93 BPM | TEMPERATURE: 97.9 F | SYSTOLIC BLOOD PRESSURE: 115 MMHG

## 2025-04-14 DIAGNOSIS — E66.813 CLASS 3 SEVERE OBESITY DUE TO EXCESS CALORIES WITH SERIOUS COMORBIDITY AND BODY MASS INDEX (BMI) OF 50.0 TO 59.9 IN ADULT: Primary | ICD-10-CM

## 2025-04-14 DIAGNOSIS — E11.65 TYPE 2 DIABETES MELLITUS WITH HYPERGLYCEMIA, WITHOUT LONG-TERM CURRENT USE OF INSULIN (HCC): ICD-10-CM

## 2025-04-14 DIAGNOSIS — E78.00 HYPERCHOLESTEROLEMIA: ICD-10-CM

## 2025-04-14 DIAGNOSIS — E03.9 ACQUIRED HYPOTHYROIDISM: ICD-10-CM

## 2025-04-14 DIAGNOSIS — I10 PRIMARY HYPERTENSION: ICD-10-CM

## 2025-04-14 DIAGNOSIS — G93.2 IIH (IDIOPATHIC INTRACRANIAL HYPERTENSION): ICD-10-CM

## 2025-04-14 DIAGNOSIS — G47.33 OSA (OBSTRUCTIVE SLEEP APNEA): ICD-10-CM

## 2025-04-14 PROCEDURE — 99214 OFFICE O/P EST MOD 30 MIN: CPT | Performed by: FAMILY MEDICINE

## 2025-04-14 PROCEDURE — 3074F SYST BP LT 130 MM HG: CPT | Performed by: FAMILY MEDICINE

## 2025-04-14 PROCEDURE — 3078F DIAST BP <80 MM HG: CPT | Performed by: FAMILY MEDICINE

## 2025-04-14 ASSESSMENT — PATIENT HEALTH QUESTIONNAIRE - PHQ9
SUM OF ALL RESPONSES TO PHQ QUESTIONS 1-9: 2
SUM OF ALL RESPONSES TO PHQ QUESTIONS 1-9: 2
1. LITTLE INTEREST OR PLEASURE IN DOING THINGS: SEVERAL DAYS
2. FEELING DOWN, DEPRESSED OR HOPELESS: SEVERAL DAYS
SUM OF ALL RESPONSES TO PHQ QUESTIONS 1-9: 2
SUM OF ALL RESPONSES TO PHQ QUESTIONS 1-9: 2

## 2025-04-14 NOTE — PROGRESS NOTES
Weight Loss Progress Note: follow up Physician Visit      Lisa Holden is a 59 y.o. female  who is here for her follow up  Evaluation for the medical bariatric care.      CC: I want to be healthier  She is still gaining weight  She says she is \" dealing with a lot\" in her house. She is angry today as she was the last visit  She is aggravated by not losing weight  She is upset that I will not take 5 lbs off of her weight based on her clothes she is wearing    I explained the most important point is the direction the weight is going in and that is up  I am not able to get information about exactly what she is eating        She had A1c in 2024 6.0  The A1c at pcp 6.2025  The trulicity is not lowering the A1c, it is rising while on the medication    Weight History  Current weight 319( 317) and BMI is Body mass index is 53.13 kg/m².  Goal weight BMI 29   Highest weight 319   (See weight gain time line scanned into media section of chart)    Hunger control: poor, there seems to be some mental health challenges getting in the way    Significant Medical History    Update on sleep apnea and  CPAP first appt in  for assessment    Ever had bariatric surgery: no    Pregnant or planning on becoming pregnant w/in 6 months: no         Significant Psychosocial History     Current Major Lifestyle Changes: no  Any potential unsupportive people: no          Social History  Social History     Tobacco Use    Smoking status: Former     Current packs/day: 0.00     Types: Cigarettes     Quit date: 2/15/2013     Years since quittin.1    Smokeless tobacco: Never    Tobacco comments:     actional smoker   Substance Use Topics    Alcohol use: Not Currently     Comment: once a year     How many times a week do you eat out?  2    Do you drink any EtOH?  no   If so, how much?        Do you have upcoming any travel in the next 6 weeks?  no   If so, what do you have planned?          Exercise  How many days a week do

## 2025-04-14 NOTE — PROGRESS NOTES
Identified pt with two pt identifiers (name and ). Reviewed chart in preparation for visit and have obtained necessary documentation.    Lisa Holden is a 59 y.o. female  Chief Complaint   Patient presents with    Weight Management     WLC/  1 month      /79 (BP Site: Left Upper Arm, Patient Position: Sitting, BP Cuff Size: Large Adult)   Pulse 93   Temp 97.9 °F (36.6 °C) (Oral)   Resp 16   Ht 1.651 m (5' 5\")   Wt (!) 144.8 kg (319 lb 4.8 oz)   SpO2 97%   BMI 53.13 kg/m²     1. Have you been to the ER, urgent care clinic since your last visit?  Hospitalized since your last visit?no    2. Have you seen or consulted any other health care providers outside of the [facility] since your last visit?  Include any pap smears or colon screening. No    Patient attended triage but did not bring homework form. Patient instructed to email or fax completed homework form to us. Patient informed that not bringing the homework form can result in not being seen next time.

## 2025-04-16 ENCOUNTER — COMMUNITY OUTREACH (OUTPATIENT)
Facility: CLINIC | Age: 60
End: 2025-04-16

## 2025-04-21 ENCOUNTER — TELEMEDICINE (OUTPATIENT)
Age: 60
End: 2025-04-21

## 2025-04-21 DIAGNOSIS — E66.813 CLASS 3 SEVERE OBESITY DUE TO EXCESS CALORIES WITH SERIOUS COMORBIDITY AND BODY MASS INDEX (BMI) OF 50.0 TO 59.9 IN ADULT (HCC): Primary | ICD-10-CM

## 2025-04-21 NOTE — PROGRESS NOTES
NUTRITION - FOLLOW-UP TREATMENT NOTE  Patient Name: Lisa Holden         Date: 2025  : 1965    YES Patient  Verified    Consent:  Patient and/or their healthcare decision maker is aware that this patient-initiated Telehealth encounter is a billable service, with coverage as determined by her insurance carrier. They are aware that they may receive a bill and has provided verbal consent to proceed: yes, confirmed      Lisa Holden, was evaluated through a synchronous (real-time) audio-video encounter. The patient (or guardian if applicable) is aware that this is a billable service, which includes applicable co-pays. This Virtual Visit was conducted with patient's (and/or legal guardian's) consent. Patient identification was verified, and a caregiver was present when appropriate.   The patient was located at Home: [address] [place] [address]  Provider was located at Home (not Appt Dept State): [place]  Confirm you are appropriately licensed, registered, or certified to deliver care in the state where the patient is located as indicated above. If you are not or unsure, please re-schedule the visit: Yes, I confirm.      Total time spent for this encounter:  58 min    --BERNA RUTH RD on 2025 at 1:02 PM    An electronic signature was used to authenticate this note.    Diagnosis: Obesity Class 3   In time:   1:02pm             Out time:   2:00pm   Total Treatment Time (min):   58 min       SUBJECTIVE/ASSESSMENT    Changes in medication or medical history? Any new allergies, surgeries or procedures?  Taking Trulicity, Ozempic awaiting approval.     This provider was 2 minutes behind schedule and arrived to the scheduled 2:00pm virtual visit at 2:02pm.  Upon my greeting her, it was not returned and only noted that I was 2 minutes late.      2# gain x  1 month.  Overall 3# gain x  3 months.  Patient noted she was fully dressed during weight check at her most recent visit

## 2025-04-22 ENCOUNTER — CLINICAL DOCUMENTATION (OUTPATIENT)
Age: 60
End: 2025-04-22

## 2025-04-23 DIAGNOSIS — E03.9 HYPOTHYROIDISM, UNSPECIFIED TYPE: ICD-10-CM

## 2025-04-23 RX ORDER — LEVOTHYROXINE SODIUM 150 UG/1
150 TABLET ORAL DAILY
Qty: 90 TABLET | Refills: 3 | Status: SHIPPED | OUTPATIENT
Start: 2025-04-23

## 2025-04-23 RX ORDER — CETIRIZINE HYDROCHLORIDE 10 MG/1
10 TABLET, FILM COATED ORAL DAILY
Qty: 90 TABLET | Refills: 0 | Status: SHIPPED | OUTPATIENT
Start: 2025-04-23

## 2025-04-23 NOTE — PROGRESS NOTES
Prior Authorization for Ozempic 2 mg/3 ml DENIED by [employer] [place] [employer].    Must provide documentation of Hgb A1c over 6.6 within last 12 months  Patient needs to try/fail 2 preferred drugs - Trulicity (trial noted) - also VICTOZA    Patient notified via [employer] message.    PA# [employer] [place] [employer] [id number] DD

## 2025-04-23 NOTE — TELEPHONE ENCOUNTER
Requested Prescriptions     Pending Prescriptions Disp Refills    EQ ALLERGY RELIEF, CETIRIZINE, 10 MG tablet [Pharmacy Med Name: EQ Allergy Relief (Cetirizine) 10 MG Oral Tablet] 90 tablet 0     Sig: Take 1 tablet by mouth once daily     Date of last OV: 01.24.2025  Future OV visit scheduled:  [x] Yes -> Date: 08.26.2025  [] No    Last Refill: [] N/A  Date: 10.22.2024  Qty: 90  # of refills: 1    Med pending for provider review:  [x] Yes  [] No (provide reason why):     Requested Pharmacy updated in ENC:  [] Yes    Applicable labs (provide date of completion):  [] Diabetic med- A1c:  [] Cholesterol med- Lipids:  [] BP med- CMP or BMP:   [] Thyroid med- TSH:  [] Gout med- Uric acid:  [] Prostate med- PSA:  [] Other (provide what type of med and lab):    Additional notes:

## 2025-05-19 ENCOUNTER — TELEPHONE (OUTPATIENT)
Age: 60
End: 2025-05-19

## 2025-05-19 NOTE — TELEPHONE ENCOUNTER
Called patient to check in regarding her visit to Dr. Gomez and the weight loss clinic. Patient has visited Dr. Gomez and we will request the most recent office note. Patient is needing to reschedule the visit with the weight loss clinic.

## 2025-05-22 ENCOUNTER — TELEPHONE (OUTPATIENT)
Age: 60
End: 2025-05-22

## 2025-05-22 NOTE — TELEPHONE ENCOUNTER
Spoke with patient to schedule 6mo follow up visit. She was schedule with NP Lack. Patient stated she typically doesn't like to see NP and was then given the option to see Dr. Toussaint- she then declined and said the NP would be fine.

## 2025-05-28 ENCOUNTER — TELEPHONE (OUTPATIENT)
Age: 60
End: 2025-05-28

## 2025-05-28 NOTE — TELEPHONE ENCOUNTER
Called patient to let her know I am logged into our virtual nutrition consult scheduled today 5/28/25 at 1:30pm.  I also sent a text message with the [email or web address] link to join the visit.  Left a voicemail letting patient know I would remain logged in another few minutes but to call the office to cancel/reschedule appointment.  [phone].

## 2025-06-12 DIAGNOSIS — E11.65 TYPE 2 DIABETES MELLITUS WITH HYPERGLYCEMIA, WITHOUT LONG-TERM CURRENT USE OF INSULIN (HCC): ICD-10-CM

## 2025-06-12 NOTE — TELEPHONE ENCOUNTER
Method of communication:  [x]Fax []Phone call []In person   []Other:    Who is making request:[address] [place], [place]  What medication/s (include strength and dosing):    Trulicity 1.5/0.5 inj    This is for a:   [x]Refill    []New medication request  []Follow up on prior request    Pharmacy:[facility] [address] [place], [place]  Best contact for patient:[phone]    Additional notes:

## 2025-06-16 ENCOUNTER — OFFICE VISIT (OUTPATIENT)
Age: 60
End: 2025-06-16
Payer: COMMERCIAL

## 2025-06-16 VITALS
BODY MASS INDEX: 48.82 KG/M2 | HEART RATE: 97 BPM | WEIGHT: 293 LBS | DIASTOLIC BLOOD PRESSURE: 80 MMHG | OXYGEN SATURATION: 100 % | HEIGHT: 65 IN | TEMPERATURE: 98 F | SYSTOLIC BLOOD PRESSURE: 124 MMHG

## 2025-06-16 DIAGNOSIS — G93.2 IIH (IDIOPATHIC INTRACRANIAL HYPERTENSION): Primary | ICD-10-CM

## 2025-06-16 PROCEDURE — 99213 OFFICE O/P EST LOW 20 MIN: CPT | Performed by: NURSE PRACTITIONER

## 2025-06-16 PROCEDURE — 3074F SYST BP LT 130 MM HG: CPT | Performed by: NURSE PRACTITIONER

## 2025-06-16 PROCEDURE — 3079F DIAST BP 80-89 MM HG: CPT | Performed by: NURSE PRACTITIONER

## 2025-06-16 NOTE — PROGRESS NOTES
Follow up per request of Dr Gomez for IIH.  Patient with no complaints related to IIH.  Denies vision changes, headaches, dizziness.  No new problems reported.    
Systems:  Pertinent items are noted in the History of Present Illness.    Objective:     Vitals:    06/16/25 1325   BP: 124/80   Pulse: 97   Temp: 98 °F (36.7 °C)   SpO2: 100%       Physical Exam:  GENERAL: Calm, cooperative, no distress,   SKIN: Warm, dry, color appropriate for ethnicity.     Neurologic Exam:  Mental Status:  Alert and oriented x 4.  Appropriate affect, mood and behavior.       Language:    Normal fluency, repetition, comprehension and naming.    Cranial Nerves:   Pupils equal, round and reactive to light.     Visual fields full to confrontation.     Extraocular movements intact.       Facial sensation intact V1 - V3.     Full facial strength, no asymmetry.      Hearing intact bilaterally.     No dysarthria. Tongue protrudes to midline, palate elevates symmetrically.      Shoulder shrug 5/5 bilaterally.    Motor:    No pronator drift.      Bulk and tone normal.      5/5 power in all extremities proximally and distally.     No involuntary movements.    Sensation:    Sensation intact throughout to light touch    Reflexes:    Deferred    Coordination & Gait: Normal      Imaging:  I personally reviewed the following imaging studies. The impressions listed below are those of the interpreting radiologist(s).    MRI brain w and WO contrast, and MRV head WO contrast reviewed, noted with patent left transverse sinus stent and stenosis of distal right transverse sinus similar to prior MRV 2016.     Assessment:     Lisa Holden is a 58 y.o. female new patient with history of anemia, arthritis, chronic back pain, diabetes, headaches, hypertension, hypothyroidism, migraine headaches, obesity, sleep apnea, and intracranial hypertension status post prior venous sinus stenting by Dr. Dyson in 2016 who returned to clinic for re-eval on referral from Dr. Gomez for evaluation.     Neuro exam today is normal.     We reviewed the results of the prior imaging studies. Her stent is patent. There is stenosis of the

## 2025-06-19 ENCOUNTER — TELEMEDICINE (OUTPATIENT)
Age: 60
End: 2025-06-19

## 2025-06-19 ENCOUNTER — TELEPHONE (OUTPATIENT)
Age: 60
End: 2025-06-19

## 2025-06-19 DIAGNOSIS — E66.813 CLASS 3 SEVERE OBESITY DUE TO EXCESS CALORIES WITH SERIOUS COMORBIDITY AND BODY MASS INDEX (BMI) OF 50.0 TO 59.9 IN ADULT (HCC): Primary | ICD-10-CM

## 2025-06-19 NOTE — TELEPHONE ENCOUNTER
Patient called needs refill for Trulicity 1.5 mg, states wegovy was denied please advise, she is a diabetic and pcp unable to refill, patient says she needs it today    [phone]   No

## 2025-06-19 NOTE — TELEPHONE ENCOUNTER
Returned patient call regarding trulicity. Advised patient that we do not fill Rx that we do not prescribe and she needs to contact PCP to fill Rx.     Patient expressed understanding

## 2025-06-19 NOTE — PROGRESS NOTES
NUTRITION - FOLLOW-UP TREATMENT NOTE  Patient Name: Lisa Holden         Date: 2025  : 1965    YES Patient  Verified    Consent:  Patient and/or their healthcare decision maker is aware that this patient-initiated Telehealth encounter is a billable service, with coverage as determined by her insurance carrier. They are aware that they may receive a bill and has provided verbal consent to proceed: yes, confirmed      Lisa Holden was evaluated through a synchronous (real-time) audio encounter. Patient identification was verified at the start of the visit. She (or guardian if applicable) is aware that this is a billable service, which includes applicable co-pays. This visit was conducted with the patient's (and/or legal guardian's) verbal consent.    The patient was located at Home: [address] [place] [address].  The provider was located at Home (not Appt Dept State): [place]  Confirm you are appropriately licensed, registered, or certified to deliver care in the state where the patient is located as indicated above. If you are not or unsure, please re-schedule the visit: Yes, I confirm.       BERNA RUTH RD      Diagnosis: Obesity Class 3   In time:   2:05pm             Out time:   2:57pm   Total Treatment Time (min):   52 min       SUBJECTIVE/ASSESSMENT    Changes in medication or medical history? Any new allergies, surgeries or procedures?  Trulicity prescribed. No more refills. Hasn't had this week.     Patient has not had any change of opinions towards topics discussed last visit, nor any progress towards nutrition goals.  Refer to our encounter 25, it outlines how each visit goes with the patient.  Today's visit was no different, and is a repeat of last session.      Patient says \"really don't eat\" and doesn't know why scale won't move.  At other points in our conversation, she says \"I eat late at night, 1-2 sandwiches at 1a-2a in the morning...  I love my

## 2025-06-20 RX ORDER — DULAGLUTIDE 1.5 MG/.5ML
1.5 INJECTION, SOLUTION SUBCUTANEOUS WEEKLY
Qty: 4 ADJUSTABLE DOSE PRE-FILLED PEN SYRINGE | Refills: 1 | Status: SHIPPED | OUTPATIENT
Start: 2025-06-20

## 2025-07-09 ENCOUNTER — OFFICE VISIT (OUTPATIENT)
Age: 60
End: 2025-07-09
Payer: COMMERCIAL

## 2025-07-09 VITALS
HEART RATE: 97 BPM | SYSTOLIC BLOOD PRESSURE: 112 MMHG | RESPIRATION RATE: 18 BRPM | OXYGEN SATURATION: 98 % | BODY MASS INDEX: 48.82 KG/M2 | DIASTOLIC BLOOD PRESSURE: 78 MMHG | HEIGHT: 65 IN | WEIGHT: 293 LBS | TEMPERATURE: 97.7 F

## 2025-07-09 DIAGNOSIS — E03.9 ACQUIRED HYPOTHYROIDISM: ICD-10-CM

## 2025-07-09 DIAGNOSIS — G93.2 IIH (IDIOPATHIC INTRACRANIAL HYPERTENSION): ICD-10-CM

## 2025-07-09 DIAGNOSIS — Z62.819 TRAUMA IN CHILDHOOD: ICD-10-CM

## 2025-07-09 DIAGNOSIS — E66.813 CLASS 3 SEVERE OBESITY DUE TO EXCESS CALORIES WITH SERIOUS COMORBIDITY AND BODY MASS INDEX (BMI) OF 50.0 TO 59.9 IN ADULT (HCC): ICD-10-CM

## 2025-07-09 DIAGNOSIS — G47.33 OSA (OBSTRUCTIVE SLEEP APNEA): ICD-10-CM

## 2025-07-09 DIAGNOSIS — E11.65 TYPE 2 DIABETES MELLITUS WITH HYPERGLYCEMIA, WITHOUT LONG-TERM CURRENT USE OF INSULIN (HCC): ICD-10-CM

## 2025-07-09 DIAGNOSIS — E66.813 CLASS 3 SEVERE OBESITY DUE TO EXCESS CALORIES WITH SERIOUS COMORBIDITY AND BODY MASS INDEX (BMI) OF 50.0 TO 59.9 IN ADULT (HCC): Primary | ICD-10-CM

## 2025-07-09 DIAGNOSIS — E78.00 HYPERCHOLESTEROLEMIA: ICD-10-CM

## 2025-07-09 DIAGNOSIS — I10 PRIMARY HYPERTENSION: ICD-10-CM

## 2025-07-09 PROCEDURE — 99214 OFFICE O/P EST MOD 30 MIN: CPT | Performed by: FAMILY MEDICINE

## 2025-07-09 PROCEDURE — 3078F DIAST BP <80 MM HG: CPT | Performed by: FAMILY MEDICINE

## 2025-07-09 PROCEDURE — 3074F SYST BP LT 130 MM HG: CPT | Performed by: FAMILY MEDICINE

## 2025-07-09 PROCEDURE — 3044F HG A1C LEVEL LT 7.0%: CPT | Performed by: FAMILY MEDICINE

## 2025-07-09 ASSESSMENT — PATIENT HEALTH QUESTIONNAIRE - PHQ9: DEPRESSION UNABLE TO ASSESS: PT REFUSES

## 2025-07-09 NOTE — PROGRESS NOTES
Identified patient with two patient identifiers (name and ). Reviewed chart in preparation for visit and have obtained necessary documentation.    Lisa Holden is a 59 y.o. female  Chief Complaint   Patient presents with    Weight Management     WLC/1 Month     BMI 52.6    /78   Pulse 97   Temp 97.7 °F (36.5 °C) (Oral)   Resp 18   Ht 1.651 m (5' 5\")   Wt (!) 144.3 kg (318 lb 1.6 oz)   SpO2 98%   BMI 52.93 kg/m²     1. Have you been to the ER, urgent care clinic since your last visit?  Hospitalized since your last visit?no    2. Have you seen or consulted any other health care providers outside of the [facility] since your last visit?  Include any pap smears or colon screening. yes - eye dr Melara became very annoyed during triage when asked about the depression screening, pt stated she \"is sick and tired of being asked this every time\" pt stated that has depression and it's ridiculous we ask every time she comes in.    Pt also demanded that 5lbs taken off of her weight entered in her chart to account for her clothing. I informed the pt this was not something we could do. Pt again demanded that I adjust the weight -- provider notified, weight not modified.

## 2025-07-09 NOTE — PROGRESS NOTES
Weight Loss Progress Note: follow up Physician Visit  [employer]    Lisa Holden is a 59 y.o. female  who is here for her follow up  Evaluation for the medical bariatric care.      CC: I want to be healthier  She says her weight at another doctor and was 315 she is upset by my saying she weighs 318    She does eat sugars and carbs, \"I am not going to kill myself not eating sugars\"    She got angry when I asked about the sleep med appt, she does not feel it is necessary because she does not believe that she has it  Our every encounter her anger has been very clear. She does not agree with all of the changes I suggest  She has also been resistant to the suggestions made by our dietitian      Weight History  Current weight 318( 319) and BMI is Body mass index is 52.93 kg/m².  Goal weight BMI 29,   Highest weight 319   (See weight gain time line scanned into media section of chart)    She has a very angry and aggressive demeanor. She speaks in an agry tone and affect, arms crosssed and angry facial expression    Significant Medical History    Update on sleep apnea and  CPAP has sleep eval this month    Ever had bariatric surgery: no    Pregnant or planning on becoming pregnant w/in 6 months: no     Exercise  How many days a week do you currently exercise?  0 days she says her back hurts  Have you ever been told by a physician not to exercise?  no      MEDS  Taking appetite meds  ozempic was denied  She is taking trulicity for diabetes    Hunger control: she reports good control    Diet  How many ounces of calorie-free liquids did you consume each day?  Not tracking oz    How many meal replacements did you take each day? 1 PP ea day    Did you have any problems adhering to the program?  no If yes, please explain:    How many times a week do you eat out?  1    SLEEP:     CPAP not using  Hours 2-3    Significant Psychosocial History     Current Major Lifestyle Changes: no  Any potential unsupportive people:

## 2025-07-10 LAB
ALBUMIN SERPL-MCNC: 3.6 G/DL (ref 3.5–5)
ALBUMIN/GLOB SERPL: 0.9 (ref 1.1–2.2)
ALP SERPL-CCNC: 86 U/L (ref 45–117)
ALT SERPL-CCNC: 19 U/L (ref 12–78)
ANION GAP SERPL CALC-SCNC: 3 MMOL/L (ref 2–12)
AST SERPL-CCNC: 12 U/L (ref 15–37)
BILIRUB SERPL-MCNC: 0.3 MG/DL (ref 0.2–1)
BUN SERPL-MCNC: 19 MG/DL (ref 6–20)
BUN/CREAT SERPL: 20 (ref 12–20)
CALCIUM SERPL-MCNC: 9.6 MG/DL (ref 8.5–10.1)
CHLORIDE SERPL-SCNC: 112 MMOL/L (ref 97–108)
CHOLEST SERPL-MCNC: 185 MG/DL
CO2 SERPL-SCNC: 24 MMOL/L (ref 21–32)
CREAT SERPL-MCNC: 0.93 MG/DL (ref 0.55–1.02)
EST. AVERAGE GLUCOSE BLD GHB EST-MCNC: 123 MG/DL
GLOBULIN SER CALC-MCNC: 4.2 G/DL (ref 2–4)
GLUCOSE SERPL-MCNC: 97 MG/DL (ref 65–100)
HBA1C MFR BLD: 5.9 % (ref 4–5.6)
HDLC SERPL-MCNC: 51 MG/DL
HDLC SERPL: 3.6 (ref 0–5)
LDLC SERPL CALC-MCNC: 118 MG/DL (ref 0–100)
POTASSIUM SERPL-SCNC: 4.2 MMOL/L (ref 3.5–5.1)
PROT SERPL-MCNC: 7.8 G/DL (ref 6.4–8.2)
SODIUM SERPL-SCNC: 139 MMOL/L (ref 136–145)
TRIGL SERPL-MCNC: 80 MG/DL
VLDLC SERPL CALC-MCNC: 16 MG/DL

## 2025-07-22 ENCOUNTER — TELEPHONE (OUTPATIENT)
Age: 60
End: 2025-07-22

## 2025-07-22 NOTE — TELEPHONE ENCOUNTER
Patient called in regards to cancelled appointment for 8/20/25 and stated her displeasure with Dr. Lucas and then proceeded to hang up the phone.

## 2025-08-04 DIAGNOSIS — E11.65 TYPE 2 DIABETES MELLITUS WITH HYPERGLYCEMIA, WITHOUT LONG-TERM CURRENT USE OF INSULIN (HCC): ICD-10-CM

## 2025-08-07 RX ORDER — DULAGLUTIDE 1.5 MG/.5ML
1.5 INJECTION, SOLUTION SUBCUTANEOUS WEEKLY
Qty: 4 ADJUSTABLE DOSE PRE-FILLED PEN SYRINGE | Refills: 0 | Status: SHIPPED | OUTPATIENT
Start: 2025-08-07

## 2025-08-07 RX ORDER — CETIRIZINE HYDROCHLORIDE 10 MG/1
10 TABLET ORAL DAILY
Qty: 90 TABLET | Refills: 0 | Status: SHIPPED | OUTPATIENT
Start: 2025-08-07

## (undated) DEVICE — PROCEDURE KIT BX00717111

## (undated) DEVICE — CANNULA NSL O2 AD 7 FT END-TIDAL CARBON DIOX VENTFLO